# Patient Record
Sex: MALE | Race: WHITE | NOT HISPANIC OR LATINO | Employment: FULL TIME | URBAN - METROPOLITAN AREA
[De-identification: names, ages, dates, MRNs, and addresses within clinical notes are randomized per-mention and may not be internally consistent; named-entity substitution may affect disease eponyms.]

---

## 2018-03-19 ENCOUNTER — HOSPITAL ENCOUNTER (EMERGENCY)
Facility: HOSPITAL | Age: 33
Discharge: LEFT BEFORE DISCHARGE | End: 2018-03-19
Payer: COMMERCIAL

## 2018-03-19 ENCOUNTER — APPOINTMENT (EMERGENCY)
Dept: RADIOLOGY | Facility: HOSPITAL | Age: 33
End: 2018-03-19
Payer: COMMERCIAL
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
## 2018-03-19 PROBLEM — S00.81XA FACIAL ABRASION: Status: ACTIVE | Noted: 2018-03-19

## 2018-03-19 PROBLEM — R00.0 TACHYCARDIA: Status: ACTIVE | Noted: 2018-03-19

## 2018-03-19 PROBLEM — M25.569 KNEE PAIN: Status: ACTIVE | Noted: 2018-03-19

## 2018-03-19 LAB
ANION GAP SERPL CALCULATED.3IONS-SCNC: 6 MMOL/L (ref 4–13)
ATRIAL RATE: 87 BPM
BASE EXCESS BLDA CALC-SCNC: -2 MMOL/L (ref -2–3)
BASOPHILS # BLD AUTO: 0.05 THOUSANDS/ΜL (ref 0–0.1)
BASOPHILS NFR BLD AUTO: 1 % (ref 0–1)
BUN SERPL-MCNC: 10 MG/DL (ref 5–25)
CA-I BLD-SCNC: 1.17 MMOL/L (ref 1.12–1.32)
CALCIUM SERPL-MCNC: 8.6 MG/DL (ref 8.3–10.1)
CHLORIDE SERPL-SCNC: 107 MMOL/L (ref 100–108)
CO2 SERPL-SCNC: 26 MMOL/L (ref 21–32)
CREAT SERPL-MCNC: 0.94 MG/DL (ref 0.6–1.3)
EOSINOPHIL # BLD AUTO: 0.19 THOUSAND/ΜL (ref 0–0.61)
EOSINOPHIL NFR BLD AUTO: 2 % (ref 0–6)
ERYTHROCYTE [DISTWIDTH] IN BLOOD BY AUTOMATED COUNT: 12.4 % (ref 11.6–15.1)
GFR SERPL CREATININE-BSD FRML MDRD: 107 ML/MIN/1.73SQ M
GLUCOSE SERPL-MCNC: 107 MG/DL (ref 65–140)
GLUCOSE SERPL-MCNC: 115 MG/DL (ref 65–140)
HCO3 BLDA-SCNC: 24 MMOL/L (ref 24–30)
HCT VFR BLD AUTO: 45.7 % (ref 36.5–49.3)
HCT VFR BLD CALC: 47 % (ref 36.5–49.3)
HGB BLD-MCNC: 16.1 G/DL (ref 12–17)
HGB BLDA-MCNC: 16 G/DL (ref 12–17)
INR PPP: 0.98 (ref 0.86–1.16)
LYMPHOCYTES # BLD AUTO: 3.53 THOUSANDS/ΜL (ref 0.6–4.47)
LYMPHOCYTES NFR BLD AUTO: 34 % (ref 14–44)
MCH RBC QN AUTO: 30.4 PG (ref 26.8–34.3)
MCHC RBC AUTO-ENTMCNC: 35.2 G/DL (ref 31.4–37.4)
MCV RBC AUTO: 86 FL (ref 82–98)
MONOCYTES # BLD AUTO: 0.5 THOUSAND/ΜL (ref 0.17–1.22)
MONOCYTES NFR BLD AUTO: 5 % (ref 4–12)
NEUTROPHILS # BLD AUTO: 6 THOUSANDS/ΜL (ref 1.85–7.62)
NEUTS SEG NFR BLD AUTO: 58 % (ref 43–75)
NRBC BLD AUTO-RTO: 0 /100 WBCS
P AXIS: 60 DEGREES
PCO2 BLD: 25 MMOL/L (ref 21–32)
PCO2 BLD: 42.7 MM HG (ref 42–50)
PH BLD: 7.36 [PH] (ref 7.3–7.4)
PLATELET # BLD AUTO: 235 THOUSANDS/UL (ref 149–390)
PMV BLD AUTO: 9.7 FL (ref 8.9–12.7)
PO2 BLD: 30 MM HG (ref 35–45)
POTASSIUM BLD-SCNC: 3.7 MMOL/L (ref 3.5–5.3)
POTASSIUM SERPL-SCNC: 4.4 MMOL/L (ref 3.5–5.3)
PR INTERVAL: 152 MS
PROTHROMBIN TIME: 13 SECONDS (ref 12.1–14.4)
QRS AXIS: 1 DEGREES
QRSD INTERVAL: 112 MS
QT INTERVAL: 350 MS
QTC INTERVAL: 421 MS
RBC # BLD AUTO: 5.29 MILLION/UL (ref 3.88–5.62)
SAO2 % BLD FROM PO2: 55 % (ref 95–98)
SODIUM BLD-SCNC: 141 MMOL/L (ref 136–145)
SODIUM SERPL-SCNC: 139 MMOL/L (ref 136–145)
SPECIMEN SOURCE: ABNORMAL
T WAVE AXIS: 44 DEGREES
VENTRICULAR RATE: 87 BPM
WBC # BLD AUTO: 10.29 THOUSAND/UL (ref 4.31–10.16)

## 2018-03-19 PROCEDURE — 71045 X-RAY EXAM CHEST 1 VIEW: CPT

## 2018-03-19 PROCEDURE — 82803 BLOOD GASES ANY COMBINATION: CPT

## 2018-03-19 PROCEDURE — 74177 CT ABD & PELVIS W/CONTRAST: CPT

## 2018-03-19 PROCEDURE — 80048 BASIC METABOLIC PNL TOTAL CA: CPT | Performed by: EMERGENCY MEDICINE

## 2018-03-19 PROCEDURE — 99284 EMERGENCY DEPT VISIT MOD MDM: CPT | Performed by: SURGERY

## 2018-03-19 PROCEDURE — 85014 HEMATOCRIT: CPT

## 2018-03-19 PROCEDURE — 84295 ASSAY OF SERUM SODIUM: CPT

## 2018-03-19 PROCEDURE — 90471 IMMUNIZATION ADMIN: CPT

## 2018-03-19 PROCEDURE — 82330 ASSAY OF CALCIUM: CPT

## 2018-03-19 PROCEDURE — 72125 CT NECK SPINE W/O DYE: CPT

## 2018-03-19 PROCEDURE — 96360 HYDRATION IV INFUSION INIT: CPT

## 2018-03-19 PROCEDURE — 82947 ASSAY GLUCOSE BLOOD QUANT: CPT

## 2018-03-19 PROCEDURE — 71260 CT THORAX DX C+: CPT

## 2018-03-19 PROCEDURE — 90715 TDAP VACCINE 7 YRS/> IM: CPT | Performed by: EMERGENCY MEDICINE

## 2018-03-19 PROCEDURE — 96361 HYDRATE IV INFUSION ADD-ON: CPT

## 2018-03-19 PROCEDURE — 93010 ELECTROCARDIOGRAM REPORT: CPT | Performed by: INTERNAL MEDICINE

## 2018-03-19 PROCEDURE — 99285 EMERGENCY DEPT VISIT HI MDM: CPT

## 2018-03-19 PROCEDURE — 85610 PROTHROMBIN TIME: CPT | Performed by: EMERGENCY MEDICINE

## 2018-03-19 PROCEDURE — 73030 X-RAY EXAM OF SHOULDER: CPT

## 2018-03-19 PROCEDURE — 36415 COLL VENOUS BLD VENIPUNCTURE: CPT | Performed by: EMERGENCY MEDICINE

## 2018-03-19 PROCEDURE — 85025 COMPLETE CBC W/AUTO DIFF WBC: CPT | Performed by: EMERGENCY MEDICINE

## 2018-03-19 PROCEDURE — 84132 ASSAY OF SERUM POTASSIUM: CPT

## 2018-03-19 PROCEDURE — 73564 X-RAY EXAM KNEE 4 OR MORE: CPT

## 2018-03-19 PROCEDURE — 70450 CT HEAD/BRAIN W/O DYE: CPT

## 2018-03-19 PROCEDURE — 93005 ELECTROCARDIOGRAM TRACING: CPT

## 2018-03-19 RX ORDER — ACETAMINOPHEN 325 MG/1
650 TABLET ORAL EVERY 6 HOURS PRN
Status: DISCONTINUED | OUTPATIENT
Start: 2018-03-19 | End: 2018-03-19 | Stop reason: HOSPADM

## 2018-03-19 RX ORDER — SODIUM CHLORIDE 9 MG/ML
125 INJECTION, SOLUTION INTRAVENOUS CONTINUOUS
Status: DISCONTINUED | OUTPATIENT
Start: 2018-03-19 | End: 2018-03-19 | Stop reason: HOSPADM

## 2018-03-19 RX ORDER — NAPROXEN 500 MG/1
500 TABLET ORAL 2 TIMES DAILY WITH MEALS
Qty: 14 TABLET | Refills: 0 | Status: SHIPPED | OUTPATIENT
Start: 2018-03-19 | End: 2018-12-03

## 2018-03-19 RX ORDER — METHOCARBAMOL 500 MG/1
500 TABLET, FILM COATED ORAL EVERY 6 HOURS PRN
Status: DISCONTINUED | OUTPATIENT
Start: 2018-03-19 | End: 2018-03-19 | Stop reason: HOSPADM

## 2018-03-19 RX ORDER — METHOCARBAMOL 500 MG/1
500 TABLET, FILM COATED ORAL 2 TIMES DAILY
Qty: 20 TABLET | Refills: 0 | Status: SHIPPED | OUTPATIENT
Start: 2018-03-19 | End: 2018-12-03

## 2018-03-19 RX ADMIN — SODIUM CHLORIDE 125 ML/HR: 0.9 INJECTION, SOLUTION INTRAVENOUS at 15:15

## 2018-03-19 RX ADMIN — ACETAMINOPHEN 650 MG: 325 TABLET ORAL at 15:26

## 2018-03-19 RX ADMIN — IOHEXOL 100 ML: 350 INJECTION, SOLUTION INTRAVENOUS at 14:35

## 2018-03-19 RX ADMIN — METHOCARBAMOL 500 MG: 500 TABLET ORAL at 15:26

## 2018-03-19 RX ADMIN — TETANUS TOXOID, REDUCED DIPHTHERIA TOXOID AND ACELLULAR PERTUSSIS VACCINE, ADSORBED 0.5 ML: 5; 2.5; 8; 8; 2.5 SUSPENSION INTRAMUSCULAR at 14:40

## 2018-03-19 NOTE — DISCHARGE INSTRUCTIONS
Please return to the emergency department immediately if you experience any worsening of her condition, any chest pain or shortness of breath, any lightheadedness or confusion  Otherwise please follow-up with your PCP for further care  Blunt Chest Trauma   WHAT YOU NEED TO KNOW:   Blunt chest trauma is a sudden, forceful injury to your chest  It is often caused by a car or motorcycle accident, blast injury, or a fall  It may also be caused by a sports injury, such as a hit from a baseball  It may be painful to take deep breaths, cough, and sleep  It can take up to 6 weeks for your injury to heal completely  DISCHARGE INSTRUCTIONS:   Seek care immediately if:   · You have a fever    · You become short of breath    · You cough up yellow, green, or bloody sputum  · You have new or increased pain  Contact your healthcare provider if:   · Your pain does not get better after you take pain medicine  · Your pain lasts longer than 8 weeks  · You have questions or concerns about your condition or care  Medicines:   · Prescription pain medicine  may be given  Ask how to take this medicine safely  Prescription pain medicine may cause constipation  Ask your healthcare provider how to prevent or treat constipation  · NSAIDs , such as ibuprofen, help decrease swelling, pain, and fever  NSAIDs can cause stomach bleeding or kidney problems in certain people  If you take blood thinner medicine, always ask your healthcare provider if NSAIDs are safe for you  Always read the medicine label and follow directions  · Take your medicine as directed  Contact your healthcare provider if you think your medicine is not helping or if you have side effects  Tell him or her if you are allergic to any medicine  Keep a list of the medicines, vitamins, and herbs you take  Include the amounts, and when and why you take them  Bring the list or the pill bottles to follow-up visits   Carry your medicine list with you in case of an emergency  Self care:  · Deep breathe and cough  to help prevent pneumonia  Take 10 deep breaths every hour, even when you wake up during the night  Brace your ribs with your hands or a pillow while you take deep breaths or cough  This will help decrease your pain  · Use your incentive spirometer  to help you take deeper breaths  Put the plastic piece into your mouth and take a very deep breath  Hold your breath as long as you can  Then let out your breath  Do this 10 times in a row every hour while you are awake  · Sleep in a recliner or upright position,  the first few nights, to decrease the pain  · Keep moving  around your home  Do not  stay in bed  Take short walks  This will help your lungs work properly and decrease your risk for pneumonia  · Do not smoke  Nicotine and other chemicals decrease the amount of oxygen in your body  Ask your healthcare provider for information if you currently smoke and need help to quit  E-cigarettes or smokeless tobacco still contain nicotine  Talk to your healthcare provider before you use these products  Follow up with your healthcare provider as directed:  Write down your questions so you remember to ask them during your visits  © 2017 [facility] Information is for End User's use only and may not be sold, redistributed or otherwise used for commercial purposes  All illustrations and images included in CareNotes® are the copyrighted property of [employer]  or [employer]  The above information is an  only  It is not intended as medical advice for individual conditions or treatments  Talk to your doctor, nurse or pharmacist before following any medical regimen to see if it is safe and effective for you
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
## 2018-03-20 VITALS
WEIGHT: 260 LBS | OXYGEN SATURATION: 99 % | TEMPERATURE: 98.4 F | HEIGHT: 70 IN | SYSTOLIC BLOOD PRESSURE: 144 MMHG | DIASTOLIC BLOOD PRESSURE: 77 MMHG | RESPIRATION RATE: 18 BRPM | BODY MASS INDEX: 37.22 KG/M2 | HEART RATE: 98 BPM

## 2018-12-03 NOTE — PRE-PROCEDURE INSTRUCTIONS
No outpatient prescriptions have been marked as taking for the 12/12/18 encounter [facility] H[facility] Encounter)      Pre procedure instructions given verbalizes undersatnding

## 2019-02-06 NOTE — PRE-PROCEDURE INSTRUCTIONS
No outpatient prescriptions have been marked as taking for the 2/13/19 encounter [facility] Encounter)  Pre procedure instructions reviewed,verbalizes understanding  Does not take any medications at home

## 2019-02-12 ENCOUNTER — ANESTHESIA EVENT (OUTPATIENT)
Dept: PERIOP | Facility: HOSPITAL | Age: 34
End: 2019-02-12
Payer: COMMERCIAL

## 2019-02-13 ENCOUNTER — ANESTHESIA (OUTPATIENT)
Dept: PERIOP | Facility: HOSPITAL | Age: 34
End: 2019-02-13
Payer: COMMERCIAL

## 2019-02-13 ENCOUNTER — HOSPITAL ENCOUNTER (OUTPATIENT)
Facility: HOSPITAL | Age: 34
Setting detail: OUTPATIENT SURGERY
Discharge: HOME | End: 2019-02-13
Attending: OTOLARYNGOLOGY | Admitting: OTOLARYNGOLOGY
Payer: COMMERCIAL

## 2019-02-13 VITALS
HEIGHT: 70 IN | BODY MASS INDEX: 38.65 KG/M2 | TEMPERATURE: 100.2 F | DIASTOLIC BLOOD PRESSURE: 69 MMHG | RESPIRATION RATE: 16 BRPM | OXYGEN SATURATION: 96 % | SYSTOLIC BLOOD PRESSURE: 131 MMHG | HEART RATE: 97 BPM | WEIGHT: 270 LBS

## 2019-02-13 DIAGNOSIS — J34.89 NASAL OBSTRUCTION: Primary | ICD-10-CM

## 2019-02-13 RX ORDER — MEPERIDINE HYDROCHLORIDE 25 MG/ML
12.5 INJECTION INTRAMUSCULAR; INTRAVENOUS; SUBCUTANEOUS ONCE AS NEEDED
Status: DISCONTINUED | OUTPATIENT
Start: 2019-02-13 | End: 2019-02-13 | Stop reason: HOSPADM

## 2019-02-13 RX ORDER — CLARITHROMYCIN 500 MG/1
500 TABLET, COATED ORAL EVERY 12 HOURS SCHEDULED
Qty: 20 TABLET | Refills: 0 | Status: SHIPPED | OUTPATIENT
Start: 2019-02-13 | End: 2019-02-23

## 2019-02-13 RX ORDER — SODIUM CHLORIDE, SODIUM LACTATE, POTASSIUM CHLORIDE, CALCIUM CHLORIDE 600; 310; 30; 20 MG/100ML; MG/100ML; MG/100ML; MG/100ML
20 INJECTION, SOLUTION INTRAVENOUS CONTINUOUS
Status: DISCONTINUED | OUTPATIENT
Start: 2019-02-13 | End: 2019-02-13 | Stop reason: HOSPADM

## 2019-02-13 RX ORDER — DEXMEDETOMIDINE HYDROCHLORIDE 100 UG/ML
INJECTION, SOLUTION INTRAVENOUS AS NEEDED
Status: DISCONTINUED | OUTPATIENT
Start: 2019-02-13 | End: 2019-02-13 | Stop reason: SURG

## 2019-02-13 RX ORDER — FENTANYL CITRATE/PF 50 MCG/ML
50 SYRINGE (ML) INJECTION
Status: DISCONTINUED | OUTPATIENT
Start: 2019-02-13 | End: 2019-02-13 | Stop reason: HOSPADM

## 2019-02-13 RX ORDER — DIPHENHYDRAMINE HYDROCHLORIDE 50 MG/ML
12.5 INJECTION INTRAMUSCULAR; INTRAVENOUS ONCE AS NEEDED
Status: DISCONTINUED | OUTPATIENT
Start: 2019-02-13 | End: 2019-02-13 | Stop reason: HOSPADM

## 2019-02-13 RX ORDER — ONDANSETRON 2 MG/ML
INJECTION INTRAMUSCULAR; INTRAVENOUS AS NEEDED
Status: DISCONTINUED | OUTPATIENT
Start: 2019-02-13 | End: 2019-02-13 | Stop reason: SURG

## 2019-02-13 RX ORDER — METOCLOPRAMIDE HYDROCHLORIDE 5 MG/ML
10 INJECTION INTRAMUSCULAR; INTRAVENOUS ONCE AS NEEDED
Status: DISCONTINUED | OUTPATIENT
Start: 2019-02-13 | End: 2019-02-13 | Stop reason: HOSPADM

## 2019-02-13 RX ORDER — LIDOCAINE HYDROCHLORIDE 10 MG/ML
INJECTION, SOLUTION INFILTRATION; PERINEURAL AS NEEDED
Status: DISCONTINUED | OUTPATIENT
Start: 2019-02-13 | End: 2019-02-13 | Stop reason: SURG

## 2019-02-13 RX ORDER — MIDAZOLAM HYDROCHLORIDE 1 MG/ML
INJECTION INTRAMUSCULAR; INTRAVENOUS AS NEEDED
Status: DISCONTINUED | OUTPATIENT
Start: 2019-02-13 | End: 2019-02-13 | Stop reason: SURG

## 2019-02-13 RX ORDER — PROPOFOL 10 MG/ML
INJECTION, EMULSION INTRAVENOUS AS NEEDED
Status: DISCONTINUED | OUTPATIENT
Start: 2019-02-13 | End: 2019-02-13 | Stop reason: SURG

## 2019-02-13 RX ORDER — ACETAMINOPHEN 325 MG/1
650 TABLET ORAL EVERY 6 HOURS PRN
Status: DISCONTINUED | OUTPATIENT
Start: 2019-02-13 | End: 2019-02-13 | Stop reason: HOSPADM

## 2019-02-13 RX ORDER — FENTANYL CITRATE 50 UG/ML
INJECTION, SOLUTION INTRAMUSCULAR; INTRAVENOUS AS NEEDED
Status: DISCONTINUED | OUTPATIENT
Start: 2019-02-13 | End: 2019-02-13 | Stop reason: SURG

## 2019-02-13 RX ORDER — LIDOCAINE HYDROCHLORIDE AND EPINEPHRINE 10; 10 MG/ML; UG/ML
INJECTION, SOLUTION INFILTRATION; PERINEURAL AS NEEDED
Status: DISCONTINUED | OUTPATIENT
Start: 2019-02-13 | End: 2019-02-13 | Stop reason: HOSPADM

## 2019-02-13 RX ORDER — HYDROMORPHONE HCL/PF 1 MG/ML
0.5 SYRINGE (ML) INJECTION
Status: DISCONTINUED | OUTPATIENT
Start: 2019-02-13 | End: 2019-02-13 | Stop reason: HOSPADM

## 2019-02-13 RX ORDER — CLINDAMYCIN PHOSPHATE 900 MG/50ML
INJECTION INTRAVENOUS AS NEEDED
Status: DISCONTINUED | OUTPATIENT
Start: 2019-02-13 | End: 2019-02-13 | Stop reason: SURG

## 2019-02-13 RX ORDER — PROMETHAZINE HYDROCHLORIDE 25 MG/ML
25 INJECTION, SOLUTION INTRAMUSCULAR; INTRAVENOUS ONCE AS NEEDED
Status: DISCONTINUED | OUTPATIENT
Start: 2019-02-13 | End: 2019-02-13 | Stop reason: HOSPADM

## 2019-02-13 RX ORDER — GINSENG 100 MG
CAPSULE ORAL AS NEEDED
Status: DISCONTINUED | OUTPATIENT
Start: 2019-02-13 | End: 2019-02-13 | Stop reason: HOSPADM

## 2019-02-13 RX ORDER — HYDROCODONE BITARTRATE AND ACETAMINOPHEN 5; 325 MG/1; MG/1
1 TABLET ORAL EVERY 6 HOURS PRN
Status: DISCONTINUED | OUTPATIENT
Start: 2019-02-13 | End: 2019-02-13 | Stop reason: HOSPADM

## 2019-02-13 RX ORDER — HYDROCODONE BITARTRATE AND ACETAMINOPHEN 5; 325 MG/1; MG/1
2 TABLET ORAL EVERY 4 HOURS PRN
Qty: 15 TABLET | Refills: 0 | Status: SHIPPED | OUTPATIENT
Start: 2019-02-13 | End: 2019-02-23

## 2019-02-13 RX ORDER — SUCCINYLCHOLINE CHLORIDE 20 MG/ML
INJECTION INTRAMUSCULAR; INTRAVENOUS AS NEEDED
Status: DISCONTINUED | OUTPATIENT
Start: 2019-02-13 | End: 2019-02-13 | Stop reason: SURG

## 2019-02-13 RX ADMIN — CLINDAMYCIN PHOSPHATE 900 MG: 18 INJECTION, SOLUTION INTRAMUSCULAR; INTRAVENOUS at 11:07

## 2019-02-13 RX ADMIN — DEXMEDETOMIDINE HYDROCHLORIDE 8 MCG: 100 INJECTION, SOLUTION INTRAVENOUS at 11:15

## 2019-02-13 RX ADMIN — PROPOFOL 300 MG: 10 INJECTION, EMULSION INTRAVENOUS at 10:55

## 2019-02-13 RX ADMIN — MIDAZOLAM 2 MG: 1 INJECTION INTRAMUSCULAR; INTRAVENOUS at 10:46

## 2019-02-13 RX ADMIN — LIDOCAINE HYDROCHLORIDE 50 MG: 10 INJECTION, SOLUTION INFILTRATION; PERINEURAL at 10:55

## 2019-02-13 RX ADMIN — ONDANSETRON 4 MG: 2 INJECTION INTRAMUSCULAR; INTRAVENOUS at 11:37

## 2019-02-13 RX ADMIN — SODIUM CHLORIDE, SODIUM LACTATE, POTASSIUM CHLORIDE, AND CALCIUM CHLORIDE 20 ML/HR: .6; .31; .03; .02 INJECTION, SOLUTION INTRAVENOUS at 09:48

## 2019-02-13 RX ADMIN — DEXMEDETOMIDINE HYDROCHLORIDE 8 MCG: 100 INJECTION, SOLUTION INTRAVENOUS at 11:26

## 2019-02-13 RX ADMIN — SUCCINYLCHOLINE CHLORIDE 140 MG: 20 INJECTION, SOLUTION INTRAMUSCULAR; INTRAVENOUS at 10:55

## 2019-02-13 RX ADMIN — DEXMEDETOMIDINE HYDROCHLORIDE 8 MCG: 100 INJECTION, SOLUTION INTRAVENOUS at 11:10

## 2019-02-13 RX ADMIN — FENTANYL CITRATE 100 MCG: 50 INJECTION, SOLUTION INTRAMUSCULAR; INTRAVENOUS at 10:55

## 2019-02-13 RX ADMIN — DEXAMETHASONE SODIUM PHOSPHATE 10 MG: 10 INJECTION INTRAMUSCULAR; INTRAVENOUS at 11:07

## 2019-02-13 RX ADMIN — HYDROCODONE BITARTRATE AND ACETAMINOPHEN 1 TABLET: 5; 325 TABLET ORAL at 13:05

## 2019-02-13 NOTE — ANESTHESIA POSTPROCEDURE EVALUATION
Post-Op Assessment Note    CV Status:  Stable  Pain Score: 0    Pain management: adequate     Mental Status:  Alert and awake   Hydration Status:  Euvolemic   PONV Controlled:  Controlled   Airway Patency:  Patent   Post Op Vitals Reviewed: Yes      Staff: CRNA           /60 (02/13/19 1206)    Temp (!) 97 2 °F (36 2 °C) (02/13/19 1206)    Pulse 102 (02/13/19 1206)   Resp 16 (02/13/19 1206)    SpO2 99 % (02/13/19 1206)

## 2019-02-13 NOTE — ANESTHESIA PREPROCEDURE EVALUATION
Review of Systems/Medical History  Patient summary reviewed        Cardiovascular   Pulmonary       GI/Hepatic            Endo/Other     GYN       Hematology   Musculoskeletal       Neurology   Psychology           Physical Exam    Airway    Mallampati score: II  TM Distance: >3 FB  Neck ROM: full     Dental       Cardiovascular      Pulmonary      Other Findings        Anesthesia Plan  ASA Score- 2     Anesthesia Type- general with ASA Monitors  Additional Monitors:   Airway Plan: ETT  Plan Factors-    Induction- intravenous  Postoperative Plan-     Informed Consent- Anesthetic plan and risks discussed with patient  I personally reviewed this patient with the CRNA  Discussed and agreed on the Anesthesia Plan with the CRNA  Clay Nair

## 2019-02-13 NOTE — OP NOTE
OPERATIVE REPORT  PATIENT NAME: Amanda Rodriguez    :  1985  MRN: [id number]  Pt Location: [facility] OR ROOM 05    SURGERY DATE: 2019    Surgeon(s) and Role:     * Rojelio Mejia MD - Primary    Preop Diagnosis:  Other specified disorders of nose and nasal sinuses [J34 89]    Post-Op Diagnosis Codes:     * Other specified disorders of nose and nasal sinuses [J34 89]    Procedure(s) (LRB):  REVISION ENDOSCOPIC POSTERIOR SEPTOPLASTY AND LYSIS INTRANASAL ADHESIONS (N/A)    Specimen(s):  * No specimens in log *    Estimated Blood Loss:   Minimal    Drains:  * No LDAs found *    Anesthesia Type:   General    Operative Indications: Other specified disorders of nose and nasal sinuses [J34 89]  Nasal obstruction    Operative Findings:  Left septal spur, right intranasal adhesion    Complications:   None    Procedure and Technique:  The patient was identified and brought into the operating room and placed on the operating table in a supine position  General anesthesia was induced  The patient was prepped and draped in the usual fashion  4% soaked cocaine pledgets were inserted into the nasal cavity bilaterally  A time-out was performed and the operation was begun  The pledgets removed  A 0 degree endoscope was introduced into the nose bilaterally  The nasal septum posterior half and bilateral region of the sphenopalatine arteries was injected with 1% lidocaine with 1 100,000 epinephrine for a measure of hemostasis  Working on the right side 1st and intranasal adhesion between the inferior turbinate and septum was identified  This was also injected with lidocaine and epinephrine  It was lysed sharply with a through cutting Blakesley forceps  The inferior turbinate was then gently outfractured to create additional space  Next a mid septal Nabeel incision was made endoscopically on the left side    The perpendicular plate of the ethmoid was identified and a mucoperiosteal flap was elevated over a septal spur on the left side  The perpendicular plate of the ethmoid was then incised with a Ducor elevator and a similar flap was elevated on the opposite side  A portion of the perpendicular plate of the ethmoid inclusive of the spur was then resected sharply with the curved Davis scissors  Additional bone from the perpendicular plate of the ethmoid was removed with a [place] Karvonen forceps  No perforation was created on the right side  The left-sided flap was then laid down against the right side  The inferior turbinate on this side was also gently outfractured with a Lincoln elevator  At this point bacitracin covered Haas splints were placed on either side of the nasal septum and secured in place with a Prolene suture holding the flaps in place  The patient was then gently awakened general anesthesia transported to the PACU in stable condition  He tolerated this well       I was present for the entire procedure    Patient Disposition:  PACU     SIGNATURE: Goldy Ruiz MD  DATE: February 13, 2019  TIME: 11:50 AM

## 2019-02-13 NOTE — DISCHARGE INSTRUCTIONS
Activity:  Light activity, no nose blowing, sneeze with your mouth open, change nasal drip pad as necessary, take your antibiotics as scheduled    Diet: Resume your regular diet    Special Instructions: Call or return with questions, concerns, or return of symptoms and No alcoholic drinks, driving, or operating heavy machinery while on prescribed pain medications    Call your physician for:  Shortness of breath that does not improve, Fever above 101ºF (38  3ºC) or shaking chills, Increasing sputum, Chest pain develops or worsens, Pain not controlled by the medication prescribed for you, Increased redness, swelling or drainage around your wound   or Questions or concerns

## 2019-08-29 ENCOUNTER — HOSPITAL ENCOUNTER (EMERGENCY)
Facility: HOSPITAL | Age: 34
Discharge: HOME | End: 2019-08-30
Attending: EMERGENCY MEDICINE | Admitting: EMERGENCY MEDICINE
Payer: COMMERCIAL

## 2019-08-29 ENCOUNTER — APPOINTMENT (EMERGENCY)
Dept: RADIOLOGY | Facility: HOSPITAL | Age: 34
End: 2019-08-29
Payer: COMMERCIAL

## 2019-08-29 DIAGNOSIS — N45.1 EPIDIDYMITIS: ICD-10-CM

## 2019-08-29 DIAGNOSIS — N50.812 LEFT TESTICULAR PAIN: Primary | ICD-10-CM

## 2019-08-29 DIAGNOSIS — M79.605 LEFT LEG PAIN: ICD-10-CM

## 2019-08-29 LAB
ALBUMIN SERPL BCP-MCNC: 4.5 G/DL (ref 3.5–5)
ALP SERPL-CCNC: 80 U/L (ref 46–116)
ALT SERPL W P-5'-P-CCNC: 50 U/L (ref 12–78)
ANION GAP SERPL CALCULATED.3IONS-SCNC: 6 MMOL/L (ref 4–13)
APTT PPP: 28 SECONDS (ref 23–37)
AST SERPL W P-5'-P-CCNC: 19 U/L (ref 5–45)
BASOPHILS # BLD AUTO: 0.08 THOUSANDS/ΜL (ref 0–0.1)
BASOPHILS NFR BLD AUTO: 1 % (ref 0–1)
BILIRUB SERPL-MCNC: 0.66 MG/DL (ref 0.2–1)
BUN SERPL-MCNC: 12 MG/DL (ref 5–25)
CALCIUM SERPL-MCNC: 9.3 MG/DL (ref 8.3–10.1)
CHLORIDE SERPL-SCNC: 104 MMOL/L (ref 100–108)
CO2 SERPL-SCNC: 30 MMOL/L (ref 21–32)
CREAT SERPL-MCNC: 1.09 MG/DL (ref 0.6–1.3)
EOSINOPHIL # BLD AUTO: 0.15 THOUSAND/ΜL (ref 0–0.61)
EOSINOPHIL NFR BLD AUTO: 1 % (ref 0–6)
ERYTHROCYTE [DISTWIDTH] IN BLOOD BY AUTOMATED COUNT: 12.1 % (ref 11.6–15.1)
GFR SERPL CREATININE-BSD FRML MDRD: 89 ML/MIN/1.73SQ M
GLUCOSE SERPL-MCNC: 110 MG/DL (ref 65–140)
HCT VFR BLD AUTO: 50.4 % (ref 36.5–49.3)
HGB BLD-MCNC: 17.1 G/DL (ref 12–17)
IMM GRANULOCYTES # BLD AUTO: 0.06 THOUSAND/UL (ref 0–0.2)
IMM GRANULOCYTES NFR BLD AUTO: 1 % (ref 0–2)
INR PPP: 1.07 (ref 0.84–1.19)
LYMPHOCYTES # BLD AUTO: 2.04 THOUSANDS/ΜL (ref 0.6–4.47)
LYMPHOCYTES NFR BLD AUTO: 19 % (ref 14–44)
MCH RBC QN AUTO: 30.2 PG (ref 26.8–34.3)
MCHC RBC AUTO-ENTMCNC: 33.9 G/DL (ref 31.4–37.4)
MCV RBC AUTO: 89 FL (ref 82–98)
MONOCYTES # BLD AUTO: 0.7 THOUSAND/ΜL (ref 0.17–1.22)
MONOCYTES NFR BLD AUTO: 7 % (ref 4–12)
NEUTROPHILS # BLD AUTO: 7.69 THOUSANDS/ΜL (ref 1.85–7.62)
NEUTS SEG NFR BLD AUTO: 71 % (ref 43–75)
NRBC BLD AUTO-RTO: 0 /100 WBCS
PLATELET # BLD AUTO: 219 THOUSANDS/UL (ref 149–390)
PMV BLD AUTO: 9.9 FL (ref 8.9–12.7)
POTASSIUM SERPL-SCNC: 4 MMOL/L (ref 3.5–5.3)
PROT SERPL-MCNC: 8.3 G/DL (ref 6.4–8.2)
PROTHROMBIN TIME: 13.5 SECONDS (ref 11.6–14.5)
RBC # BLD AUTO: 5.67 MILLION/UL (ref 3.88–5.62)
SODIUM SERPL-SCNC: 140 MMOL/L (ref 136–145)
WBC # BLD AUTO: 10.72 THOUSAND/UL (ref 4.31–10.16)

## 2019-08-29 PROCEDURE — 85610 PROTHROMBIN TIME: CPT | Performed by: EMERGENCY MEDICINE

## 2019-08-29 PROCEDURE — 99284 EMERGENCY DEPT VISIT MOD MDM: CPT

## 2019-08-29 PROCEDURE — 76870 US EXAM SCROTUM: CPT

## 2019-08-29 PROCEDURE — 36415 COLL VENOUS BLD VENIPUNCTURE: CPT | Performed by: EMERGENCY MEDICINE

## 2019-08-29 PROCEDURE — 85730 THROMBOPLASTIN TIME PARTIAL: CPT | Performed by: EMERGENCY MEDICINE

## 2019-08-29 PROCEDURE — 99284 EMERGENCY DEPT VISIT MOD MDM: CPT | Performed by: EMERGENCY MEDICINE

## 2019-08-29 PROCEDURE — 96374 THER/PROPH/DIAG INJ IV PUSH: CPT

## 2019-08-29 PROCEDURE — 85025 COMPLETE CBC W/AUTO DIFF WBC: CPT | Performed by: EMERGENCY MEDICINE

## 2019-08-29 PROCEDURE — 96361 HYDRATE IV INFUSION ADD-ON: CPT

## 2019-08-29 PROCEDURE — 80053 COMPREHEN METABOLIC PANEL: CPT | Performed by: EMERGENCY MEDICINE

## 2019-08-29 RX ORDER — KETOROLAC TROMETHAMINE 30 MG/ML
15 INJECTION, SOLUTION INTRAMUSCULAR; INTRAVENOUS ONCE
Status: COMPLETED | OUTPATIENT
Start: 2019-08-29 | End: 2019-08-29

## 2019-08-29 RX ADMIN — SODIUM CHLORIDE 1000 ML: 0.9 INJECTION, SOLUTION INTRAVENOUS at 22:50

## 2019-08-29 RX ADMIN — KETOROLAC TROMETHAMINE 15 MG: 30 INJECTION, SOLUTION INTRAMUSCULAR at 22:48

## 2019-08-30 ENCOUNTER — HOSPITAL ENCOUNTER (OUTPATIENT)
Dept: ULTRASOUND IMAGING | Facility: HOSPITAL | Age: 34
Discharge: HOME | End: 2019-08-30
Attending: EMERGENCY MEDICINE
Payer: COMMERCIAL

## 2019-08-30 VITALS
DIASTOLIC BLOOD PRESSURE: 67 MMHG | SYSTOLIC BLOOD PRESSURE: 140 MMHG | TEMPERATURE: 98.1 F | RESPIRATION RATE: 18 BRPM | BODY MASS INDEX: 38.74 KG/M2 | WEIGHT: 270 LBS | OXYGEN SATURATION: 97 % | HEART RATE: 99 BPM

## 2019-08-30 DIAGNOSIS — M79.605 LEFT LEG PAIN: ICD-10-CM

## 2019-08-30 LAB
ATRIAL RATE: 99 BPM
BILIRUB UR QL STRIP: NEGATIVE
C TRACH DNA SPEC QL NAA+PROBE: NEGATIVE
CLARITY UR: CLEAR
COLOR UR: YELLOW
GLUCOSE UR STRIP-MCNC: NEGATIVE MG/DL
HGB UR QL STRIP.AUTO: NEGATIVE
KETONES UR STRIP-MCNC: NEGATIVE MG/DL
LEUKOCYTE ESTERASE UR QL STRIP: NEGATIVE
N GONORRHOEA DNA SPEC QL NAA+PROBE: NEGATIVE
NITRITE UR QL STRIP: NEGATIVE
P AXIS: 59 DEGREES
PH UR STRIP.AUTO: 6.5 [PH]
PR INTERVAL: 160 MS
PROT UR STRIP-MCNC: NEGATIVE MG/DL
QRS AXIS: -1 DEGREES
QRSD INTERVAL: 92 MS
QT INTERVAL: 322 MS
QTC INTERVAL: 413 MS
SP GR UR STRIP.AUTO: 1.02 (ref 1–1.03)
T WAVE AXIS: 51 DEGREES
UROBILINOGEN UR QL STRIP.AUTO: 1 E.U./DL
VENTRICULAR RATE: 99 BPM

## 2019-08-30 PROCEDURE — 93971 EXTREMITY STUDY: CPT

## 2019-08-30 PROCEDURE — 81003 URINALYSIS AUTO W/O SCOPE: CPT | Performed by: EMERGENCY MEDICINE

## 2019-08-30 PROCEDURE — 87491 CHLMYD TRACH DNA AMP PROBE: CPT | Performed by: EMERGENCY MEDICINE

## 2019-08-30 PROCEDURE — 96372 THER/PROPH/DIAG INJ SC/IM: CPT

## 2019-08-30 PROCEDURE — 93005 ELECTROCARDIOGRAM TRACING: CPT

## 2019-08-30 PROCEDURE — 93010 ELECTROCARDIOGRAM REPORT: CPT | Performed by: INTERNAL MEDICINE

## 2019-08-30 PROCEDURE — 87591 N.GONORRHOEAE DNA AMP PROB: CPT | Performed by: EMERGENCY MEDICINE

## 2019-08-30 RX ORDER — DOXYCYCLINE HYCLATE 100 MG/1
100 CAPSULE ORAL ONCE
Status: COMPLETED | OUTPATIENT
Start: 2019-08-30 | End: 2019-08-30

## 2019-08-30 RX ORDER — DOXYCYCLINE 100 MG/1
100 CAPSULE ORAL 2 TIMES DAILY
Qty: 20 CAPSULE | Refills: 0 | Status: SHIPPED | OUTPATIENT
Start: 2019-08-30 | End: 2019-09-09

## 2019-08-30 RX ADMIN — LIDOCAINE HYDROCHLORIDE 250 MG: 10 INJECTION, SOLUTION EPIDURAL; INFILTRATION; INTRACAUDAL; PERINEURAL at 00:17

## 2019-08-30 RX ADMIN — DOXYCYCLINE 100 MG: 100 CAPSULE ORAL at 00:29

## 2019-08-30 NOTE — ED PROVIDER NOTES
History  Chief Complaint   Patient presents with    Leg Pain     Pt c/o bilateral leg pain and swelling with red patches on each leg  Pt also c/o swollen testicles that are painful to touch  Pt states he felt like he was going to "pass out" when getting into hospital bed  HPI    36 y o  m presents to the ED for evaluation of left testicular pain  Patient states that he has been having testicular pain for the past 2 days, it is tender to the touch  He states that is testicle is swollen  He endorses nausea from the pain, but denies any abdominal pain, and denies any emesis  He has endorse some weakness, he states that he has been walking and standing for 24 hours at a time, 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  He is also concerned because he noticed that he had some redness in his left lower leg, no calf tenderness, that began 3 days prior  He denies any fevers  He states that he has had redness the past leg, when he stands a lot  He does have tattoos, but he said that the tattoos are several years old  Otherwise on ROS, patient denies any other symptoms  None       History reviewed  No pertinent past medical history  Past Surgical History:   Procedure Laterality Date    NASAL/SINUS ENDOSCOPY N/A 2/13/2019    Procedure: REVISION ENDOSCOPIC POSTERIOR SEPTOPLASTY AND LYSIS INTRANASAL ADHESIONS;  Surgeon: Devin Miles MD;  Location: [facility];  Service: ENT    SEPTOPLASTY      WISDOM TOOTH EXTRACTION         Family History   Problem Relation Age of Onset    No Known Problems Mother     No Known Problems Father      I have reviewed and agree with the history as documented  Social History     Tobacco Use    Smoking status: Current Every Day Smoker     Types: Cigars    Smokeless tobacco: Never Used   Substance Use Topics    Alcohol use: No    Drug use: No        Review of Systems   Constitutional: Negative for chills, fatigue and fever  HENT: Negative for nosebleeds and sore throat      Eyes: Negative for redness and visual disturbance  Respiratory: Negative for shortness of breath and wheezing  Cardiovascular: Negative for chest pain and palpitations  Gastrointestinal: Negative for abdominal pain and diarrhea  Endocrine: Negative for polyuria  Genitourinary: Positive for testicular pain  Negative for decreased urine volume, difficulty urinating, discharge, dysuria, enuresis, flank pain, frequency, genital sores, hematuria, penile pain, penile swelling, scrotal swelling and urgency  Musculoskeletal: Negative for back pain and neck stiffness  Skin: Positive for color change and rash  Negative for wound  Neurological: Negative for seizures, speech difficulty and headaches  Psychiatric/Behavioral: Negative for dysphoric mood and hallucinations  All other systems reviewed and are negative  Physical Exam  ED Triage Vitals   Temperature Pulse Respirations Blood Pressure SpO2   08/29/19 2034 08/29/19 2034 08/29/19 2034 08/29/19 2034 08/29/19 2034   98 1 °F (36 7 °C) (!) 114 18 (!) 190/86 98 %      Temp src Heart Rate Source Patient Position - Orthostatic VS BP Location FiO2 (%)   -- 08/29/19 2100 08/29/19 2100 08/29/19 2200 --    Monitor Lying Left arm       Pain Score       08/29/19 2200       3             Orthostatic Vital Signs  Vitals:    08/29/19 2034 08/29/19 2100 08/29/19 2200 08/30/19 0021   BP: (!) 190/86 128/70 123/89 140/67   Pulse: (!) 114 (!) 108 (!) 107 99   Patient Position - Orthostatic VS:  Lying Lying Lying       Physical Exam   Constitutional: He is oriented to person, place, and time  He appears well-developed and well-nourished  HENT:   Head: Normocephalic and atraumatic  Right Ear: External ear normal    Left Ear: External ear normal    Mouth/Throat: Oropharynx is clear and moist    Eyes: Conjunctivae and EOM are normal    Neck: Normal range of motion  Cardiovascular: Normal rate, regular rhythm, normal heart sounds and intact distal pulses  Pulmonary/Chest: Effort normal and breath sounds normal  He has no wheezes  He exhibits no tenderness  Abdominal: Soft  Bowel sounds are normal  There is no tenderness  There is no guarding  Genitourinary:   Genitourinary Comments: Patient has tenderness of his left testicle on palpation  Pain is not relieved with elevation  Right testicle nontender  No overlying skin changes or erythema   Musculoskeletal: Normal range of motion  Neurological: He is alert and oriented to person, place, and time  No cranial nerve deficit or sensory deficit  He exhibits normal muscle tone  Coordination normal    Skin: Skin is warm and dry  Rash noted  See photo below, patient has a macular, blanching erythematous rash, on left shin, calf nontender  Soft compartments  No free air subcutaneous emphysema noted on examination  Psychiatric: He has a normal mood and affect  Nursing note and vitals reviewed  ED Medications  Medications   sodium chloride 0 9 % bolus 1,000 mL (0 mL Intravenous Stopped 8/30/19 0000)   ketorolac (TORADOL) injection 15 mg (15 mg Intravenous Given 8/29/19 2248)   cefTRIAXone (ROCEPHIN) 250 mg in lidocaine (PF) (XYLOCAINE-MPF) 1 % IM only syringe (250 mg Intramuscular Given 8/30/19 0017)   doxycycline hyclate (VIBRAMYCIN) capsule 100 mg (100 mg Oral Given 8/30/19 0029)       Diagnostic Studies  Results Reviewed     Procedure Component Value Units Date/Time    Chlamydia/GC amplified DNA by PCR [[id number]] Collected:  08/30/19 0029    Lab Status:   In process Specimen:  Urine, Other Updated:  08/30/19 0037    UA w Reflex to Microscopic w Reflex to Culture [[id number]] Collected:  08/30/19 0012    Lab Status:  Final result Specimen:  Urine, Clean Catch Updated:  08/30/19 0023     Color, UA Yellow     Clarity, UA Clear     Specific Gravity, UA 1 018     pH, UA 6 5     Leukocytes, UA Negative     Nitrite, UA Negative     Protein, UA Negative mg/dl      Glucose, UA Negative mg/dl      Ketones, UA Negative mg/dl      Urobilinogen, UA 1 0 E U /dl      Bilirubin, UA Negative     Blood, UA Negative    Comprehensive metabolic panel [[id number]]  (Abnormal) Collected:  08/29/19 2246    Lab Status:  Final result Specimen:  Blood from Arm, Right Updated:  08/29/19 2323     Sodium 140 mmol/L      Potassium 4 0 mmol/L      Chloride 104 mmol/L      CO2 30 mmol/L      ANION GAP 6 mmol/L      BUN 12 mg/dL      Creatinine 1 09 mg/dL      Glucose 110 mg/dL      Calcium 9 3 mg/dL      AST 19 U/L      ALT 50 U/L      Alkaline Phosphatase 80 U/L      Total Protein 8 3 g/dL      Albumin 4 5 g/dL      Total Bilirubin 0 66 mg/dL      eGFR 89 ml/min/1 73sq m     Narrative:       [place] guidelines for Chronic Kidney Disease (CKD):     Stage 1 with normal or high GFR (GFR > 90 mL/min/1 73 square meters)    Stage 2 Mild CKD (GFR = 60-89 mL/min/1 73 square meters)    Stage 3A Moderate CKD (GFR = 45-59 mL/min/1 73 square meters)    Stage 3B Moderate CKD (GFR = 30-44 mL/min/1 73 square meters)    Stage 4 Severe CKD (GFR = 15-29 mL/min/1 73 square meters)    Stage 5 End Stage CKD (GFR <15 mL/min/1 73 square meters)  Note: GFR calculation is accurate only with a steady state creatinine    Protime-INR [[id number]]  (Normal) Collected:  08/29/19 2246    Lab Status:  Final result Specimen:  Blood from Arm, Right Updated:  08/29/19 2318     Protime 13 5 seconds      INR 1 07    APTT [[id number]]  (Normal) Collected:  08/29/19 2246    Lab Status:  Final result Specimen:  Blood from Arm, Right Updated:  08/29/19 2318     PTT 28 seconds     CBC and differential [[id number]]  (Abnormal) Collected:  08/29/19 2246    Lab Status:  Final result Specimen:  Blood from Arm, Right Updated:  08/29/19 2302     WBC 10 72 Thousand/uL      RBC 5 67 Million/uL      Hemoglobin 17 1 g/dL      Hematocrit 50 4 %      MCV 89 fL      MCH 30 2 pg      MCHC 33 9 g/dL      RDW 12 1 %      MPV 9 9 fL      Platelets 886 Thousands/uL nRBC 0 /100 WBCs      Neutrophils Relative 71 %      Immat GRANS % 1 %      Lymphocytes Relative 19 %      Monocytes Relative 7 %      Eosinophils Relative 1 %      Basophils Relative 1 %      Neutrophils Absolute 7 69 Thousands/µL      Immature Grans Absolute 0 06 Thousand/uL      Lymphocytes Absolute 2 04 Thousands/µL      Monocytes Absolute 0 70 Thousand/µL      Eosinophils Absolute 0 15 Thousand/µL      Basophils Absolute 0 08 Thousands/µL                  US scrotum and testicles   Final Result by Zoe Borden MD (08/29 2345)       There is hypervascular flow within the left epididymal tail  This suggests left epididymitis  Clinical correlation and follow-up to ensure resolution is recommended  There is a small left hydrocele  There is trace fluid on the right  No evidence of focal intratesticular mass  No evidence of testicular torsion  Workstation performed: [id number]         VAS lower limb venous duplex study, unilateral/limited    (Results Pending)         Procedures  Procedures        ED Course  ED Course as of Aug 30 0129   Thu Aug 29, 2019   2340 WBC(!): 10 72   Fri Aug 30, 2019   0022 ECG 12 Lead Documentation  Date/Time: today/date: 8/30/2019    ECG reviewed by me, the ED Provider: yes    Patient location:  ED   Previous ECG:  Compared to current, no change   Rate:  ECG rate assessment: normal    Rhythm: sinus rhythm    Ectopy:  : none    QRS axis:  Normal  Intervals: normal   Q waves: None   ST segments:  Normal  T waves: normal      Impression: Normal EKG                    MDM    80-year-old male who presents to the ED for evaluation of left testicular pain, as well as left lower extremity skin changes  Left testicular pain, concern for epididymitis, versus torsion  Ultrasound showed no signs of torsion, more likely epididymitis  Patient was treated with ceftriaxone and doxycycline  Had a urinalysis that was sent for gonorrhea/chlamydia, as well   Patient left shin was minimally tender on palpation, skin changes could be from an early cellulitis  Patient was being placed on doxycycline for epididymitis, will also cover skin infections, patient will be discharged with PCP follow-up, as well as a outpatient script for duplex  The patient was instructed to follow up as documented  Strict return precautions were discussed with the patient and the patient was instructed to return to the emergency department immediately if symptoms worsen  The patient/patient family member acknowledged and were in agreement with plan  Disposition  Final diagnoses:   Left testicular pain   Epididymitis   Left leg pain     Time reflects when diagnosis was documented in both MDM as applicable and the Disposition within this note     Time User Action Codes Description Comment    8/30/2019 12:24 AM Madolyn Heckler Add [N50 812] Left testicular pain     8/30/2019 12:27 AM Madolyn Heckler Add [N45 1] Epididymitis     8/30/2019 12:27 AM Madolyn Heckler Modify [N50 812] Left testicular pain     8/30/2019 12:27 AM Madolyn Heckler Modify [N45 1] Epididymitis     8/30/2019 12:27 AM Madolyn Heckler Modify [N50 812] Left testicular pain     8/30/2019 12:27 AM Madolyn Heckler Modify [N45 1] Epididymitis     8/30/2019 12:34 AM Madolyn Heckler Add [M79 605] Left leg pain       ED Disposition     ED Disposition Condition Date/Time Comment    Discharge Stable Fri Aug 30, 2019 12:24 AM Estrella Rivera discharge to home/self care  Follow-up Information     Follow up With Specialties Details Why Contact Info    Edmond Davis PA-C Internal Medicine, Physician Assistant Schedule an appointment as soon as possible for a visit in 1 week For follow up regarding your symptoms and recheck, If unable to get into primary care doctor's office [id number] [facility]  [phone]      [employer]  Call  To find a primary care doctor [phone]      [facility]  Schedule an appointment as soon as possible for a visit in 1 week For follow up with your PCP regarding your symptoms and recheck           Discharge Medication List as of 8/30/2019 12:37 AM      START taking these medications    Details   doxycycline monohydrate (MONODOX) 100 mg capsule Take 1 capsule (100 mg total) by mouth 2 (two) times a day for 10 days, Starting Fri 8/30/2019, Until Mon 9/9/2019, Print           Outpatient Discharge Orders   VAS lower limb venous duplex study, unilateral/limited   Standing Status: Future Standing Exp  Date: 08/30/23       ED Provider  Attending physically available and evaluated Myron Martell I managed the patient along with the ED Attending      Electronically Signed by         Conrad Guerra MD  08/30/19 8675

## 2019-08-30 NOTE — ED ATTENDING ATTESTATION
Ella Floyd MD, saw and evaluated the patient  I have discussed the patient with the resident/non-physician practitioner and agree with the resident's/non-physician practitioner's findings, Plan of Care, and MDM as documented in the resident's/non-physician practitioner's note, except where noted  All available labs and Radiology studies were reviewed  I was present for key portions of any procedure(s) performed by the resident/non-physician practitioner and I was immediately available to provide assistance  At this point I agree with the current assessment done in the Emergency Department  I have conducted an independent evaluation of this patient a history and physical is as follows:    OA:   This is evaluation of a 28-year-old male who presents to the emergency department complaining of bilateral lower extremity pain over anterior lower legs with scattered erythematous patches over his bilateral tib fibs  He describes the pain as "shin split" like pain  Worsened after working 48 hours, long hours on his feet and then going to a football practice this evening  Has had similar previously after long hours on feet  Patient also notes that he has testicular pain and swelling most pronounced on the left compared to the right for 2 days, which is ultimately what brought him to the ED today  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, no exposures/trauma  He denies any trauma or exposures  He denies any urinary symptoms  No penile discharge  No abdominal pain but does feel mildly nauseous from his testicular pain  No fevers no chills  No cp/pressure/palpitations/SOB/YOU  He also notes that he has been feeling weak and had an episode of lightheadedness today when moving into the bed, upon arrival to the ED but believes that it is secondary to his long work hours followed by standing on the sidelines at practice and the testicular pain   Has not rested or eaten/drank much today  On exam patient is currently in no acute distress  He is mildly tachycardic at 103, he is afebrile  HEENT is normocephalic and atraumatic with moist mucous membranes, anicteriec  Neck is supple with full range of motion  Heart is mildly tachycardic, regular  There is no appreciable murmurs  Lungs are clear auscultation bilaterally  Abdomen is soft non-tender non-distended positive bowel sounds no rebound or guarding  Patient has some no appreciable swelling to bilateral lower extremities however with erythematous patchy rash that appears macular over bilateral, more pronounced on the L tib-fib however pt also with multiple tattoes  Scattered areas thania more so than others  TTP only over anterior tib-fib, no palpable cords, no calf ttp, no pain with dorsiflexion, no LE swelling  Testicular exam performed in presence of chaperone tenderness to palpation over left epididymis no appreciable swelling/ erythema  No palpable crepitus  Patient is circumcised  Intact distal pulses and capillary refill less than 2 seconds  Awake alert oriented and appropriate  Assessment and plan testicular pain and lower extremity rash  Will evaluate with basic blood work, check EKG  Will give IV fluid hydration and pain control  Testicular u/s  Will obtain basic blood work  Will re-evaluate and treat accordingly  Pt up and ambulating without difficulty  AFter fluids and pain control, feels improved  Portions of the record may have been created with voice recognition software  Occasional wrong word or sound-a-like" substitutions may have occurred due to the inherent limitations of voice recognition software  Review chart carefully and recognize, using context, where substitutions have occurred    Critical Care Time  Procedures

## 2021-08-19 ENCOUNTER — EVALUATION (OUTPATIENT)
Dept: PHYSICAL THERAPY | Facility: CLINIC | Age: 36
End: 2021-08-19
Payer: COMMERCIAL

## 2021-08-19 DIAGNOSIS — M89.8X1 CHRONIC SCAPULAR PAIN: Primary | ICD-10-CM

## 2021-08-19 DIAGNOSIS — G89.29 CHRONIC SCAPULAR PAIN: Primary | ICD-10-CM

## 2021-08-19 PROCEDURE — 97112 NEUROMUSCULAR REEDUCATION: CPT | Performed by: PHYSICAL THERAPIST

## 2021-08-19 PROCEDURE — 97161 PT EVAL LOW COMPLEX 20 MIN: CPT | Performed by: PHYSICAL THERAPIST

## 2021-08-19 PROCEDURE — 97110 THERAPEUTIC EXERCISES: CPT | Performed by: PHYSICAL THERAPIST

## 2021-08-19 NOTE — LETTER
2021    Rani Moore PA-C  [address]    Patient: Delaney Ty   YOB: 1985   Date of Visit: 2021     Encounter Diagnosis     ICD-10-CM    1  Chronic scapular pain  M89 8X1     G89 29        Dear Dr Wilfrid Price: Thank you for your recent referral of Delaney Ty  Please review the attached evaluation summary from [facility] recent visit  Please verify that you agree with the plan of care by signing the attached order  If you have any questions or concerns, please do not hesitate to call  I sincerely appreciate the opportunity to share in the care of one of your patients and hope to have another opportunity to work with you in the near future  Sincerely,    Olive Gatica, PT      Referring Provider:      I certify that I have read the below Plan of Care and certify the need for these services furnished under this plan of treatment while under my care  Rani Moore PA-C  [address] [place] [address]  Via Fax: [phone]          PT Evaluation     Today's date: 2021  Patient name: Delaney Ty  : 1985  MRN: [id number]  Referring provider: Darcy Valdes  Dx:   Encounter Diagnosis     ICD-10-CM    1  Chronic scapular pain  M89 8X1     G89 29                   Assessment  Assessment details: Pt presents with s/s consistent with chronic R>L midtrap TrP secondary to postural dysfunction  He will benefit from skilled PT services to address his impairments in order to decrease pain and restore function    Impairments: abnormal muscle firing, abnormal muscle tone, abnormal or restricted ROM, abnormal movement, activity intolerance, impaired physical strength, lacks appropriate home exercise program, pain with function, scapular dyskinesis, poor posture  and poor body mechanics  Understanding of Dx/Px/POC: good   Prognosis: good    Goals  STG - 4 wks  1) pt will improve postural abnormalities 50%  2) pt will demonstrate 4/5 B MT, LT strength  3) pt will improve pain levels 50%    LTG - 8-12 wks  1) pt will demonstrate normalized posture  2) pt will demonstrate 4+/5 B MT, LT strength  3) pt will improve pain levels 90%    Plan  Planned modality interventions: low level laser therapy  Planned therapy interventions: joint mobilization, manual therapy, abdominal trunk stabilization, body mechanics training, neuromuscular re-education, patient education, postural training, strengthening, stretching, therapeutic activities, therapeutic exercise, flexibility, functional ROM exercises and home exercise program  Frequency: 2x week  Duration in weeks: 8  Treatment plan discussed with: patient        Subjective Evaluation    History of Present Illness  Mechanism of injury: Pt is a 29 y o  male with unremarkable PMHx  He reports chronic Hx of R>L midscapular pain without radicular symptoms or wrap-around pain  He reports no improvement with chiropractic x 10 treatments or NIKITA, short-term improvement with R MT TrP injection  Pain  Current pain ratin  At best pain ratin  At worst pain ratin  Quality: burning and sharp  Progression: no change    Social Support    Employment status: working ([employer])    Diagnostic Tests  MRI studies: normal (per pt report)  Treatments  Previous treatment: chiropractic, injection treatment and medication  Patient Goals  Patient goals for therapy: decreased pain and increased motion          Objective     Postural Observations  Seated posture: poor  Standing posture: poor  Correction of posture: makes symptoms better    Additional Postural Observation Details  Pt reports fatigue maintaining postural correction > 1 minute    O: severe B rounded shoulders, over-developement of B UT, pec major, anterior deltoid noted    Palpation   Left   Hypertonic in the cervical paraspinals, middle trapezius, rhomboids and upper trapezius       Right   Hypertonic in the cervical paraspinals, middle trapezius, rhomboids and upper trapezius  Tenderness of the middle trapezius  Trigger point to middle trapezius  Active Range of Motion   Cervical/Thoracic Spine       Cervical  Subcranial protraction:  WFL   Subcranial retraction:   Restriction level: moderate  Flexion:  WFL  Extension:  Restriction level: moderate  Left lateral flexion:  Restriction level: moderate  Right lateral flexion:  Restriction level moderate  Left rotation:  Restriction level: minimal  Right rotation:  Restriction level: minimal    Thoracic    Flexion:  Restriction level: minimal  Extension:  Restriction level: moderate  Left lateral flexion:  Restriction level: minimal  Right lateral flexion:  Restriction level: minimal  Left rotation:  Restriction level: minimal  Right rotation:  Restriction level: minimal    Scapular Mobility     Right Shoulder   Scapular Mobility with Shoulder to 90° FF   Scapular elevation: minimal    Scapular Mobility beyond 90° FF   Scapular elevation: minimal  Upward rotation: excessive    Joint Play     Hypomobile: T1, T2, T3, T4, T5, T6, T7, T8 and T9     Strength/Myotome Testing     Left Shoulder     Isolated Muscles   Lower trapezius: 4-   Middle trapezius: 4-   Rhomboids: 4     Right Shoulder     Isolated Muscles   Lower trapezius: 3   Middle trapezius: 3   Rhomboids: 4-              Precautions:  PMHx: unremarkable  Dx: chronic R>L midtrap TrP secondary to postural dysfunction    Manuals 8/19            Gr V prone T3-8 HVLA x1 ea            Gr IV T3-8 PA gallito Crandall R yadira MT, LT                          Neuro Re-Ed             Seated c/s retraction 2"x10            Seated c/s retraction with OP             scap retraction 5"x10            Prone I's 2x10            Prone T's             Prone ER             Prone thoracic extension             Ther Ex             Seated thoracic extension over chair 3"x10            Seated rhomboid/MT stretch 10"x3 Mumtaz pec stretch 10"x3                                                                             Ther Activity                                       Gait Training                                       Modalities

## 2021-08-19 NOTE — PROGRESS NOTES
PT Evaluation     Today's date: 2021  Patient name: Melany Hill  : 1985  MRN: [id number]  Referring provider: Jesse Erwin  Dx:   Encounter Diagnosis     ICD-10-CM    1  Chronic scapular pain  M89 8X1     G89 29                   Assessment  Assessment details: Pt presents with s/s consistent with chronic R>L midtrap TrP secondary to postural dysfunction  He will benefit from skilled PT services to address his impairments in order to decrease pain and restore function  Impairments: abnormal muscle firing, abnormal muscle tone, abnormal or restricted ROM, abnormal movement, activity intolerance, impaired physical strength, lacks appropriate home exercise program, pain with function, scapular dyskinesis, poor posture  and poor body mechanics  Understanding of Dx/Px/POC: good   Prognosis: good    Goals  STG - 4 wks  1) pt will improve postural abnormalities 50%  2) pt will demonstrate 4/5 B MT, LT strength  3) pt will improve pain levels 50%    LTG - 8-12 wks  1) pt will demonstrate normalized posture  2) pt will demonstrate 4+/5 B MT, LT strength  3) pt will improve pain levels 90%    Plan  Planned modality interventions: low level laser therapy  Planned therapy interventions: joint mobilization, manual therapy, abdominal trunk stabilization, body mechanics training, neuromuscular re-education, patient education, postural training, strengthening, stretching, therapeutic activities, therapeutic exercise, flexibility, functional ROM exercises and home exercise program  Frequency: 2x week  Duration in weeks: 8  Treatment plan discussed with: patient        Subjective Evaluation    History of Present Illness  Mechanism of injury: Pt is a 29 y o  male with unremarkable PMHx  He reports chronic Hx of R>L midscapular pain without radicular symptoms or wrap-around pain   He reports no improvement with chiropractic x 10 treatments or NIKITA, short-term improvement with R MT TrP injection  Pain  Current pain ratin  At best pain ratin  At worst pain ratin  Quality: burning and sharp  Progression: no change    Social Support    Employment status: working ([employer])    Diagnostic Tests  MRI studies: normal (per pt report)  Treatments  Previous treatment: chiropractic, injection treatment and medication  Patient Goals  Patient goals for therapy: decreased pain and increased motion          Objective     Postural Observations  Seated posture: poor  Standing posture: poor  Correction of posture: makes symptoms better    Additional Postural Observation Details  Pt reports fatigue maintaining postural correction > 1 minute    O: severe B rounded shoulders, over-developement of B UT, pec major, anterior deltoid noted    Palpation   Left   Hypertonic in the cervical paraspinals, middle trapezius, rhomboids and upper trapezius  Right   Hypertonic in the cervical paraspinals, middle trapezius, rhomboids and upper trapezius  Tenderness of the middle trapezius  Trigger point to middle trapezius       Active Range of Motion   Cervical/Thoracic Spine       Cervical  Subcranial protraction:  WFL   Subcranial retraction:   Restriction level: moderate  Flexion:  WFL  Extension:  Restriction level: moderate  Left lateral flexion:  Restriction level: moderate  Right lateral flexion:  Restriction level moderate  Left rotation:  Restriction level: minimal  Right rotation:  Restriction level: minimal    Thoracic    Flexion:  Restriction level: minimal  Extension:  Restriction level: moderate  Left lateral flexion:  Restriction level: minimal  Right lateral flexion:  Restriction level: minimal  Left rotation:  Restriction level: minimal  Right rotation:  Restriction level: minimal    Scapular Mobility     Right Shoulder   Scapular Mobility with Shoulder to 90° FF   Scapular elevation: minimal    Scapular Mobility beyond 90° FF   Scapular elevation: minimal  Upward rotation: excessive    Joint Play     Hypomobile: T1, T2, T3, T4, T5, T6, T7, T8 and T9     Strength/Myotome Testing     Left Shoulder     Isolated Muscles   Lower trapezius: 4-   Middle trapezius: 4-   Rhomboids: 4     Right Shoulder     Isolated Muscles   Lower trapezius: 3   Middle trapezius: 3   Rhomboids: 4-              Precautions:  PMHx: unremarkable  Dx: chronic R>L midtrap TrP secondary to postural dysfunction    Manuals 8/19            Gr V prone T3-8 HVLA x1 ea            Gr IV T3-8 PA gallito may, MT, LT                          Neuro Re-Ed             Seated c/s retraction 2"x10            Seated c/s retraction with OP             scap retraction 5"x10            Prone I's 2x10            Prone T's             Prone ER             Prone thoracic extension             Ther Ex             Seated thoracic extension over chair 3"x10            Seated rhomboid/MT stretch 10"x3            Doorway pec stretch 10"x3                                                                             Ther Activity                                       Gait Training                                       Modalities

## 2021-08-26 ENCOUNTER — OFFICE VISIT (OUTPATIENT)
Dept: PHYSICAL THERAPY | Facility: CLINIC | Age: 36
End: 2021-08-26
Payer: COMMERCIAL

## 2021-08-26 DIAGNOSIS — G89.29 CHRONIC SCAPULAR PAIN: Primary | ICD-10-CM

## 2021-08-26 DIAGNOSIS — M89.8X1 CHRONIC SCAPULAR PAIN: Primary | ICD-10-CM

## 2021-08-26 PROCEDURE — 97140 MANUAL THERAPY 1/> REGIONS: CPT | Performed by: PHYSICAL THERAPIST

## 2021-08-26 PROCEDURE — 97110 THERAPEUTIC EXERCISES: CPT | Performed by: PHYSICAL THERAPIST

## 2021-08-26 PROCEDURE — 97112 NEUROMUSCULAR REEDUCATION: CPT | Performed by: PHYSICAL THERAPIST

## 2021-08-26 NOTE — PROGRESS NOTES
Daily Note     Today's date: 2021  Patient name: Zulma Landis  : 1985  MRN: [id number]  Referring provider: Sary Casiano PA-C  Dx:   Encounter Diagnosis     ICD-10-CM    1  Chronic scapular pain  M89 8X1     G89 29                   Subjective: Pt reports good compliance with HEP but notes increased soreness after performing  He notes R>L midscapular pain is still intermittent  Objective: See treatment diary below    Assessment: Pt demonstrated improved but still limited tolerance to scap stab  Plan: Assess response nv  Precautions:  PMHx: unremarkable  Dx: chronic R>L midtrap TrP secondary to postural dysfunction    Manuals            Gr V prone T3-8 HVLA x1 ea np           Gr IV T3-8 PA mobs  1x10 ea           Raz R rhomboid, MT, LT  8 min                        Neuro Re-Ed             Seated c/s retraction 2"x10 2"x15           Seated c/s retraction with OP  2"x15           scap retraction 5"x10 5"x10           Prone I's 2x10 3x10           Prone T's  2x10           Prone ER             Prone thoracic extension  2x10           Ther Ex             Seated thoracic extension over chair 3"x10 3"x10           Seated rhomboid/MT stretch 10"x3 10"x3           Doorway pec stretch 10"x3 10"x3                                                                            Ther Activity                                       Gait Training                                       Modalities

## 2021-09-01 ENCOUNTER — OFFICE VISIT (OUTPATIENT)
Dept: PHYSICAL THERAPY | Facility: CLINIC | Age: 36
End: 2021-09-01
Payer: COMMERCIAL

## 2021-09-01 DIAGNOSIS — G89.29 CHRONIC SCAPULAR PAIN: Primary | ICD-10-CM

## 2021-09-01 DIAGNOSIS — M89.8X1 CHRONIC SCAPULAR PAIN: Primary | ICD-10-CM

## 2021-09-01 PROCEDURE — 97110 THERAPEUTIC EXERCISES: CPT

## 2021-09-01 PROCEDURE — 97112 NEUROMUSCULAR REEDUCATION: CPT

## 2021-09-01 PROCEDURE — 97140 MANUAL THERAPY 1/> REGIONS: CPT

## 2021-09-01 NOTE — PROGRESS NOTES
Daily Note     Today's date: 2021  Patient name: Estrella Rivera  : 1985  MRN: [id number]  Referring provider: Roel Tang PA-C  Dx:   Encounter Diagnosis     ICD-10-CM    1  Chronic scapular pain  M89 8X1     G89 29                   Subjective: Pt reports he continues to have mid-back pain primarily along R mid-low trap with no improvement with symptoms since LV  Notes he was less sore following his LV compared to his IE  Objective: See treatment diary below      Assessment: Tolerated treatment fair  Pt was able to perform all exercise with no complaints of increase in symptoms  UT dominance noted with all scapular stabilization exercises, requiring verbal and tactile cueing to correct  Slight - moderate soft tissue restriction present along R low trap during IASTM, which did begin to resolve slightly, but resulted in minimal to no relief for patient  Patient would benefit from continued PT to further increase scapular strength/stability, improve posture, and reduce pain  Plan: Continue per plan of care  Precautions:  PMHx: unremarkable  Dx: chronic R>L midtrap TrP secondary to postural dysfunction    Manuals           Gr V prone T3-8 HVLA x1 ea np np          Gr IV T3-8 PA mobs  1x10 ea np          Graston R rhomboid, MT, LT  8 min 8 min                       Neuro Re-Ed             Seated c/s retraction 2"x10 2"x15 2"x15          Seated c/s retraction with OP  2"x15 2"x15          scap retraction 5"x10 5"x10 5"x10          Prone I's 2x10 3x10 3x10          Prone T's  2x10 2x10          Prone ER             Prone thoracic extension  2x10 2x10          Ther Ex             Seated thoracic extension over chair 3"x10 3"x10 3"x10          Seated rhomboid/MT stretch 10"x3 10"x3 10"x3          Doorway pec stretch 10"x3 10"x3 10"x3                                                                           Ther Activity                                       Gait Training Modalities

## 2021-09-03 ENCOUNTER — OFFICE VISIT (OUTPATIENT)
Dept: PHYSICAL THERAPY | Facility: CLINIC | Age: 36
End: 2021-09-03
Payer: COMMERCIAL

## 2021-09-03 DIAGNOSIS — G89.29 CHRONIC SCAPULAR PAIN: Primary | ICD-10-CM

## 2021-09-03 DIAGNOSIS — M89.8X1 CHRONIC SCAPULAR PAIN: Primary | ICD-10-CM

## 2021-09-03 PROCEDURE — 97112 NEUROMUSCULAR REEDUCATION: CPT | Performed by: PHYSICAL THERAPIST

## 2021-09-03 PROCEDURE — 97140 MANUAL THERAPY 1/> REGIONS: CPT | Performed by: PHYSICAL THERAPIST

## 2021-09-03 PROCEDURE — 97110 THERAPEUTIC EXERCISES: CPT | Performed by: PHYSICAL THERAPIST

## 2021-09-03 NOTE — PROGRESS NOTES
Daily Note     Today's date: 9/3/2021  Patient name: Dewey Talamantes  : 1985  MRN: [id number]  Referring provider: Skip Moise PA-C  Dx:   Encounter Diagnosis     ICD-10-CM    1  Chronic scapular pain  M89 8X1     G89 29                   Subjective: Pt reports high pain levels while standing for a while at his son's football practice  He reports generalized B midscap soreness currently and also notes since IE  Objective: See treatment diary below    Assessment: Pt demonstrates limited postural endurance and R 7/8th rib hypomobility  Pt demonstrated a good initial response to rib mobs  Plan: Assess response nv  Precautions:  PMHx: unremarkable  Dx: chronic R>L midtrap TrP secondary to postural dysfunction    Manuals 8/19 8/26 9/1 9/3         Gr V prone T3-8 HVLA x1 ea np np np         Gr IV T3-8 PA mobs  1x10 ea np 1x30         Graston R rhomboid, MT, LT  8 min 8 min 8 min         R 7/8th rib PA mobs    Gr IV  3x30 ea         Neuro Re-Ed             Seated c/s retraction 2"x10 2"x15 2"x15 2"x15         Seated c/s retraction with OP  2"x15 2"x15 2"x15         scap retraction 5"x10 5"x10 5"x10 5"x10         Prone I's 2x10 3x10 3x10 3x12         Prone T's  2x10 2x10 2x12         Prone ER             bridges    3x10         Prone thoracic extension  2x10 2x10 2x10         Ther Ex             Seated thoracic extension over chair 3"x10 3"x10 3"x10 3"x10         Seated rhomboid/MT stretch 10"x3 10"x3 10"x3 10"x3         Doorway pec stretch 10"x3 10"x3 10"x3 10"x3                                                                          Ther Activity                                       Gait Training                                       Modalities

## 2021-09-08 ENCOUNTER — OFFICE VISIT (OUTPATIENT)
Dept: PHYSICAL THERAPY | Facility: CLINIC | Age: 36
End: 2021-09-08
Payer: COMMERCIAL

## 2021-09-08 DIAGNOSIS — G89.29 CHRONIC SCAPULAR PAIN: Primary | ICD-10-CM

## 2021-09-08 DIAGNOSIS — M89.8X1 CHRONIC SCAPULAR PAIN: Primary | ICD-10-CM

## 2021-09-08 PROCEDURE — 97110 THERAPEUTIC EXERCISES: CPT

## 2021-09-08 PROCEDURE — 97112 NEUROMUSCULAR REEDUCATION: CPT

## 2021-09-08 PROCEDURE — 97140 MANUAL THERAPY 1/> REGIONS: CPT

## 2021-09-08 NOTE — PROGRESS NOTES
Daily Note     Today's date: 2021  Patient name: Susan Roche  : 1985  MRN: [id number]  Referring provider: Tonya Pozo PA-C  Dx:   Encounter Diagnosis     ICD-10-CM    1  Chronic scapular pain  M89 8X1     G89 29                   Subjective: Pt reports having some relief following mobilizations performed LV, but relief did not last very long and quickly returned to baseline  Objective: See treatment diary below  Assessment: Tolerated treatment well  Continues to present with limitations in postural endurance  Trialled standing rhomboid stretch today with hands anchored against door jam; no really change in relief between standing or sitting  Improved mobility of R 7/8 rib by physical therapist, Wali Fuller, compared to LV  Patient would benefit from continued PT  Plan: Continue per plan of care  Precautions:  PMHx: unremarkable  Dx: chronic R>L midtrap TrP secondary to postural dysfunction    Manuals 8/19 8/26 9/1 9/3 9        Gr V prone T3-8 HVLA x1 ea np np np np        Gr IV T3-8 PA mobs  1x10 ea np 1x30 np        Graston R rhomboid, MT, LT  8 min 8 min 8 min 8 min        R 7/8th rib PA mobs    Gr IV  3x30 ea Gr IV  3x30 ea        Neuro Re-Ed             Seated c/s retraction 2"x10 2"x15 2"x15 2"x15 2"x15          Seated c/s retraction with OP  2"x15 2"x15 2"x15 2"x15        scap retraction 5"x10 5"x10 5"x10 5"x10 5"x10        Prone I's 2x10 3x10 3x10 3x12 3x12        Prone T's  2x10 2x10 2x12 2x12        Prone ER             bridges    3x10 3x10        Prone thoracic extension  2x10 2x10 2x10 2x10        Ther Ex             Seated thoracic extension over chair 3"x10 3"x10 3"x10 3"x10 3"x10        Seated rhomboid/MT stretch 10"x3 10"x3 10"x3 10"x3 10"x3  stand        Doorway pec stretch 10"x3 10"x3 10"x3 10"x3 10"x3                                                                         Ther Activity                                       Gait Training Modalities

## 2021-09-15 ENCOUNTER — APPOINTMENT (OUTPATIENT)
Dept: PHYSICAL THERAPY | Facility: CLINIC | Age: 36
End: 2021-09-15
Payer: COMMERCIAL

## 2021-09-17 ENCOUNTER — OFFICE VISIT (OUTPATIENT)
Dept: PHYSICAL THERAPY | Facility: CLINIC | Age: 36
End: 2021-09-17
Payer: COMMERCIAL

## 2021-09-17 DIAGNOSIS — M89.8X1 CHRONIC SCAPULAR PAIN: Primary | ICD-10-CM

## 2021-09-17 DIAGNOSIS — G89.29 CHRONIC SCAPULAR PAIN: Primary | ICD-10-CM

## 2021-09-17 PROCEDURE — 97110 THERAPEUTIC EXERCISES: CPT

## 2021-09-17 PROCEDURE — 97140 MANUAL THERAPY 1/> REGIONS: CPT

## 2021-09-17 PROCEDURE — 97112 NEUROMUSCULAR REEDUCATION: CPT

## 2021-09-17 NOTE — PROGRESS NOTES
Daily Note     Today's date: 2021  Patient name: Lanre Epps  : 1985  MRN: [id number]  Referring provider: Daphne Del Rio PA-C  Dx:   Encounter Diagnosis     ICD-10-CM    1  Chronic scapular pain  M89 8X1     G89 29                   Subjective: Pt reports he road his motorcycle round trip to [facility] twice this past weekend, which caused significant increase in symptoms  States rest and lying down helps with relieving symptoms  Any sort of activity or even just sitting in the car slowly worsens symptoms throughout the day  Objective: See treatment diary below      Assessment: Tolerated treatment well  Continues to present with limited postural endurance with daily activities  Moderate soft tissue restriction and increased tone along R MT which did begin to resolve slightly with manual IASTM  Patient would benefit from continued PT to further increase strength, improve posture, and reduce pain/frequency of symptoms  Plan: Continue per plan of care  Precautions:  PMHx: unremarkable  Dx: chronic R>L midtrap TrP secondary to postural dysfunction    Manuals 8/19 8/26 9/1 9/3 9/8 9/17       Gr V prone T3-8 HVLA x1 ea np np np np np       Gr IV T3-8 PA mobs  1x10 ea np 1x30 np np       Graston R rhomboid, MT, LT  8 min 8 min 8 min 8 min 8 min       R 7/8th rib PA mobs    Gr IV  3x30 ea Gr IV  3x30 ea nv       Neuro Re-Ed             Seated c/s retraction 2"x10 2"x15 2"x15 2"x15 2"x15   2"x15       Seated c/s retraction with OP  2"x15 2"x15 2"x15 2"x15 2"x15       scap retraction 5"x10 5"x10 5"x10 5"x10 5"x10 5"x10       Prone I's 2x10 3x10 3x10 3x12 3x12 3x12       Prone T's  2x10 2x10 2x12 2x12 2x12       Prone ER             bridges    3x10 3x10 3x15       Prone thoracic extension  2x10 2x10 2x10 2x10 2x10       Ther Ex             Seated thoracic extension over chair 3"x10 3"x10 3"x10 3"x10 3"x10 3"x10       Seated rhomboid/MT stretch 10"x3 10"x3 10"x3 10"x3 10"x3  stand 10"x3 seated       Doorway pec stretch 10"x3 10"x3 10"x3 10"x3 10"x3 10"x3                                                                        Ther Activity                                       Gait Training                                       Modalities

## 2021-09-21 ENCOUNTER — APPOINTMENT (OUTPATIENT)
Dept: PHYSICAL THERAPY | Facility: CLINIC | Age: 36
End: 2021-09-21
Payer: COMMERCIAL

## 2021-09-23 ENCOUNTER — APPOINTMENT (OUTPATIENT)
Dept: PHYSICAL THERAPY | Facility: CLINIC | Age: 36
End: 2021-09-23
Payer: COMMERCIAL

## 2022-01-20 ENCOUNTER — APPOINTMENT (OUTPATIENT)
Dept: LAB | Facility: AMBULARY SURGERY CENTER | Age: 37
End: 2022-01-20
Payer: COMMERCIAL

## 2022-01-20 ENCOUNTER — OFFICE VISIT (OUTPATIENT)
Dept: GASTROENTEROLOGY | Facility: AMBULARY SURGERY CENTER | Age: 37
End: 2022-01-20
Payer: COMMERCIAL

## 2022-01-20 VITALS
BODY MASS INDEX: 42.36 KG/M2 | WEIGHT: 286 LBS | HEIGHT: 69 IN | DIASTOLIC BLOOD PRESSURE: 82 MMHG | SYSTOLIC BLOOD PRESSURE: 132 MMHG

## 2022-01-20 DIAGNOSIS — R10.31 RLQ ABDOMINAL PAIN: ICD-10-CM

## 2022-01-20 DIAGNOSIS — R14.0 BLOATING: ICD-10-CM

## 2022-01-20 DIAGNOSIS — R14.0 BLOATING: Primary | ICD-10-CM

## 2022-01-20 DIAGNOSIS — R10.11 RUQ PAIN: ICD-10-CM

## 2022-01-20 LAB
ALBUMIN SERPL BCP-MCNC: 4.1 G/DL (ref 3.5–5)
ALP SERPL-CCNC: 61 U/L (ref 46–116)
ALT SERPL W P-5'-P-CCNC: 43 U/L (ref 12–78)
ANION GAP SERPL CALCULATED.3IONS-SCNC: 4 MMOL/L (ref 4–13)
AST SERPL W P-5'-P-CCNC: 16 U/L (ref 5–45)
BASOPHILS # BLD AUTO: 0.11 THOUSANDS/ΜL (ref 0–0.1)
BASOPHILS NFR BLD AUTO: 1 % (ref 0–1)
BILIRUB SERPL-MCNC: 0.69 MG/DL (ref 0.2–1)
BUN SERPL-MCNC: 9 MG/DL (ref 5–25)
CALCIUM SERPL-MCNC: 9.4 MG/DL (ref 8.3–10.1)
CHLORIDE SERPL-SCNC: 108 MMOL/L (ref 100–108)
CO2 SERPL-SCNC: 27 MMOL/L (ref 21–32)
CREAT SERPL-MCNC: 0.89 MG/DL (ref 0.6–1.3)
EOSINOPHIL # BLD AUTO: 0.3 THOUSAND/ΜL (ref 0–0.61)
EOSINOPHIL NFR BLD AUTO: 3 % (ref 0–6)
ERYTHROCYTE [DISTWIDTH] IN BLOOD BY AUTOMATED COUNT: 12.7 % (ref 11.6–15.1)
GFR SERPL CREATININE-BSD FRML MDRD: 110 ML/MIN/1.73SQ M
GLUCOSE P FAST SERPL-MCNC: 87 MG/DL (ref 65–99)
HCT VFR BLD AUTO: 51.5 % (ref 36.5–49.3)
HGB BLD-MCNC: 17.2 G/DL (ref 12–17)
IMM GRANULOCYTES # BLD AUTO: 0.05 THOUSAND/UL (ref 0–0.2)
IMM GRANULOCYTES NFR BLD AUTO: 1 % (ref 0–2)
LYMPHOCYTES # BLD AUTO: 3.37 THOUSANDS/ΜL (ref 0.6–4.47)
LYMPHOCYTES NFR BLD AUTO: 34 % (ref 14–44)
MCH RBC QN AUTO: 30.1 PG (ref 26.8–34.3)
MCHC RBC AUTO-ENTMCNC: 33.4 G/DL (ref 31.4–37.4)
MCV RBC AUTO: 90 FL (ref 82–98)
MONOCYTES # BLD AUTO: 0.6 THOUSAND/ΜL (ref 0.17–1.22)
MONOCYTES NFR BLD AUTO: 6 % (ref 4–12)
NEUTROPHILS # BLD AUTO: 5.47 THOUSANDS/ΜL (ref 1.85–7.62)
NEUTS SEG NFR BLD AUTO: 55 % (ref 43–75)
NRBC BLD AUTO-RTO: 0 /100 WBCS
PLATELET # BLD AUTO: 263 THOUSANDS/UL (ref 149–390)
PMV BLD AUTO: 9.9 FL (ref 8.9–12.7)
POTASSIUM SERPL-SCNC: 3.8 MMOL/L (ref 3.5–5.3)
PROT SERPL-MCNC: 8 G/DL (ref 6.4–8.2)
RBC # BLD AUTO: 5.71 MILLION/UL (ref 3.88–5.62)
SODIUM SERPL-SCNC: 139 MMOL/L (ref 136–145)
TSH SERPL DL<=0.05 MIU/L-ACNC: 1.23 UIU/ML (ref 0.36–3.74)
WBC # BLD AUTO: 9.9 THOUSAND/UL (ref 4.31–10.16)

## 2022-01-20 PROCEDURE — 86231 EMA EACH IG CLASS: CPT

## 2022-01-20 PROCEDURE — 86677 HELICOBACTER PYLORI ANTIBODY: CPT

## 2022-01-20 PROCEDURE — 86364 TISS TRNSGLTMNASE EA IG CLAS: CPT

## 2022-01-20 PROCEDURE — 80053 COMPREHEN METABOLIC PANEL: CPT

## 2022-01-20 PROCEDURE — 84443 ASSAY THYROID STIM HORMONE: CPT

## 2022-01-20 PROCEDURE — 36415 COLL VENOUS BLD VENIPUNCTURE: CPT

## 2022-01-20 PROCEDURE — 86258 DGP ANTIBODY EACH IG CLASS: CPT

## 2022-01-20 PROCEDURE — 85025 COMPLETE CBC W/AUTO DIFF WBC: CPT

## 2022-01-20 PROCEDURE — 99204 OFFICE O/P NEW MOD 45 MIN: CPT | Performed by: PHYSICIAN ASSISTANT

## 2022-01-20 PROCEDURE — 82784 ASSAY IGA/IGD/IGG/IGM EACH: CPT

## 2022-01-20 RX ORDER — ACYCLOVIR 800 MG/1
TABLET ORAL
COMMUNITY
Start: 2021-12-01

## 2022-01-20 RX ORDER — DOCOSANOL 100 MG/G
CREAM TOPICAL
COMMUNITY
Start: 2021-12-01

## 2022-01-20 RX ORDER — ONDANSETRON 4 MG/1
TABLET, ORALLY DISINTEGRATING ORAL
COMMUNITY
Start: 2021-12-18

## 2022-01-20 RX ORDER — METHYLPREDNISOLONE 4 MG/1
TABLET ORAL
COMMUNITY
Start: 2021-12-20

## 2022-01-20 RX ORDER — TIZANIDINE 4 MG/1
TABLET ORAL
COMMUNITY
Start: 2021-11-09

## 2022-01-20 NOTE — PROGRESS NOTES
[facility] - Outpatient Consultation  Amanda Rodriguez 29 y o  male MRN: [id number]  Encounter: [id number]          ASSESSMENT AND PLAN:      1  Bloating  Reports upper abdominal bloating for the past few months  No association with food intake  Reports bowel movements are relatively normal without any significant constipation or diarrhea  No improvement with Gas-X  No frequent NSAIDs  Weight stable  Possibly secondary to gastritis versus H pylori infection versus celiac versus SIBO  -obtain CBC, CMP, TSH, celiac and H pylori testing     -start Prilosec 40 milligrams daily in the morning before breakfast     -recommend trial of peppermint oil supplement     -patient reports getting MRI for back recently and reports provider did not see any abnormalities of abdomen  Unfortunately, these reports are not available to me  I recommended that patient try to obtain reports improvement next follow-up for review     -for now, will obtain abdominal ultrasound     -if any abnormalities with MRI or if MRI did not significantly assess abdomen, would consider CT scan at next office visit   -if no improvement and negative workup, could consider pursuing EGD and colonoscopy as well as Xifaxan trial for SIBO  2  RLQ discomfort  Patient reports right lower quadrant discomfort which is always present upon wakening  Reports it is relatively dull and comes and goes  Occasional urgency with bowel movements however has 1-3 daily without difficulty  No blood in stool  No recent lab work  Possibly functional in the setting of irritable bowel syndrome     -obtain abdominal ultrasound for now     -patient will try to obtain previous MRI results    Consider CT scan at next office visit if any abnormalities or if MRI did not assess abdomen well     -obtain lab work as noted above     -trial of peppermint oil as noted above for bloating as well  -consider trial of Bentyl at next office visit if no June 9, 2017      Ronny Driscoll MD  [address] [facility]  [address]  [place] [address]           [facility]  [address] [facility]  [address]  [place] [address]  Phone: [phone]  Fax: [phone]          Patient: Naresh Painting   MR Number: [id number]   YOB: 1948   Date of Visit: 6/9/2017       Dear Dr. Ronny Driscoll:    Thank you for referring Naresh Painting to me for evaluation. Attached you will find relevant portions of my assessment and plan of care.    If you have questions, please do not hesitate to call me. I look forward to following Naresh Painting along with you.    Sincerely,    Fidencio Rdz MD    Enclosure  CC:  No Recipients    If you would like to receive this communication electronically, please contact [email or web address] or [phone] to request more information on [employer] Link access.    For providers and/or their staff who would like to refer a patient to [facility], please contact us through our one-stop-shop provider referral line, [facility], at [phone].    If you feel you have received this communication in error or would no longer like to receive these types of communications, please e-mail [email or web address]          improvement        Patient will follow up in 2-3 months     ______________________________________________________________________    HPI:      Mary Garvin is a 67-year-old male with no significant past medical history who presents as a new patient for bloating  Patient reports symptoms of bloating and occasional right lower quadrant discomfort for the past few months  Reports does not seem to be associated with any specific foods  He reports right lower quadrant pain is present upon awakening  He describes it is a pressure which comes and goes  No association with bowel movements or eating  He reports bowel movements are regular and has 1-3 a day  Occasional urgency  Denies any blood in the stool  Weight is stable  Recent biotics  No significant changes in diet  Denies smoking or alcohol use  No family history of known celiac disease, IBD  Reports cousin on mother's side had colon cancer as well cystic fibrosis  No prior abdominal surgeries  No prior EGD or colonoscopy  Patient reports significant back pain as well for which she is following with a chiropractor  He reports having MRI likely of the thoracic spine recently  He reports he asked his provider if any internal abdominal organs looked abnormal which she believes they did not  No report is available for me to review in the chart  REVIEW OF SYSTEMS:    CONSTITUTIONAL: Denies any fever, chills, rigors, and weight loss  HEENT: No earache or tinnitus  Denies hearing loss or visual disturbances  CARDIOVASCULAR: No chest pain or palpitations  RESPIRATORY: Denies any cough, hemoptysis, shortness of breath or dyspnea on exertion  GASTROINTESTINAL: As noted in the History of Present Illness  GENITOURINARY: No problems with urination  Denies any hematuria or dysuria  NEUROLOGIC: No dizziness or vertigo, denies headaches  MUSCULOSKELETAL: +back pain   SKIN: Denies skin rashes or itching     ENDOCRINE: Denies excessive thirst  Denies intolerance to heat or cold  PSYCHOSOCIAL: Denies depression or anxiety  Denies any recent memory loss  Historical Information   History reviewed  No pertinent past medical history  Past Surgical History:   Procedure Laterality Date    NASAL/SINUS ENDOSCOPY N/A 2/13/2019    Procedure: REVISION ENDOSCOPIC POSTERIOR SEPTOPLASTY AND LYSIS INTRANASAL ADHESIONS;  Surgeon: Eric Reid MD;  Location: [facility];  Service: ENT    SEPTOPLASTY      WISDOM TOOTH EXTRACTION       Social History   Social History     Substance and Sexual Activity   Alcohol Use Yes    Comment: Rare      Social History     Substance and Sexual Activity   Drug Use No     Social History     Tobacco Use   Smoking Status Former Smoker    Types: Cigars   Smokeless Tobacco Never Used     Family History   Problem Relation Age of Onset    No Known Problems Mother     No Known Problems Father     Colon cancer Cousin        Meds/Allergies       Current Outpatient Medications:     acyclovir (ZOVIRAX) 800 mg tablet    Diclofenac Epolamine 1 3 % PTCH    docosanol (ABREVA) 10 %    methylPREDNISolone 4 MG tablet therapy pack    mupirocin (BACTROBAN) 2 % ointment    tiZANidine (ZANAFLEX) 4 mg tablet    Allergies   Allergen Reactions    Other Anaphylaxis     Bee stings stepped on nest as child    Amoxicillin      Childhood allergy    Pollen Extract Other (See Comments)           Objective     Blood pressure 132/82, height 5' 9" (1 753 m), weight 130 kg (286 lb)  Body mass index is 42 23 kg/m²  PHYSICAL EXAM:      General Appearance:   Alert, cooperative, no distress   HEENT:   Normocephalic, atraumatic, anicteric      Neck:  Supple, symmetrical, trachea midline   Lungs:   Clear to auscultation bilaterally; no rales, rhonchi or wheezing; respirations unlabored    Heart[de-identified]   Regular rate and rhythm; no murmur, rub, or gallop     Abdomen:   Soft, non-tender, non-distended; normal bowel sounds; no masses, no organomegaly    Genitalia:   Deferred    Rectal:   Deferred    Extremities:  No cyanosis, clubbing or edema    Pulses:  2+ and symmetric    Skin:  No jaundice, rashes, or lesions    Lymph nodes:  No palpable cervical lymphadenopathy        Lab Results:   No visits with results within 1 Day(s) from this visit     Latest known visit with results is:   Admission on 08/29/2019, Discharged on 08/30/2019   Component Date Value    WBC 08/29/2019 10 72*    RBC 08/29/2019 5 67*    Hemoglobin 08/29/2019 17 1*    Hematocrit 08/29/2019 50 4*    MCV 08/29/2019 89     MCH 08/29/2019 30 2     MCHC 08/29/2019 33 9     RDW 08/29/2019 12 1     MPV 08/29/2019 9 9     Platelets 41/10/6063 219     nRBC 08/29/2019 0     Neutrophils Relative 08/29/2019 71     Immat GRANS % 08/29/2019 1     Lymphocytes Relative 08/29/2019 19     Monocytes Relative 08/29/2019 7     Eosinophils Relative 08/29/2019 1     Basophils Relative 08/29/2019 1     Neutrophils Absolute 08/29/2019 7 69*    Immature Grans Absolute 08/29/2019 0 06     Lymphocytes Absolute 08/29/2019 2 04     Monocytes Absolute 08/29/2019 0 70     Eosinophils Absolute 08/29/2019 0 15     Basophils Absolute 08/29/2019 0 08     Sodium 08/29/2019 140     Potassium 08/29/2019 4 0     Chloride 08/29/2019 104     CO2 08/29/2019 30     ANION GAP 08/29/2019 6     BUN 08/29/2019 12     Creatinine 08/29/2019 1 09     Glucose 08/29/2019 110     Calcium 08/29/2019 9 3     AST 08/29/2019 19     ALT 08/29/2019 50     Alkaline Phosphatase 08/29/2019 80     Total Protein 08/29/2019 8 3*    Albumin 08/29/2019 4 5     Total Bilirubin 08/29/2019 0 66     eGFR 08/29/2019 89     Protime 08/29/2019 13 5     INR 08/29/2019 1 07     PTT 08/29/2019 28     Color, UA 08/30/2019 Yellow     Clarity, UA 08/30/2019 Clear     Specific Gravity, UA 08/30/2019 1 018     pH, UA 08/30/2019 6 5     Leukocytes, UA 08/30/2019 Negative     Nitrite, UA 08/30/2019 Negative     Protein, UA 08/30/2019 Negative     Glucose, UA 08/30/2019 Negative     Ketones, UA 08/30/2019 Negative     Urobilinogen, UA 08/30/2019 1 0     Bilirubin, UA 08/30/2019 Negative     Blood, UA 08/30/2019 Negative     N gonorrhoeae, DNA Probe 08/30/2019 Negative     Chlamydia trachomatis, D* 08/30/2019 Negative     Ventricular Rate 08/30/2019 99     Atrial Rate 08/30/2019 99     MD Interval 08/30/2019 160     QRSD Interval 08/30/2019 92     QT Interval 08/30/2019 322     QTC Interval 08/30/2019 413     P Axis 08/30/2019 59     QRS Axis 08/30/2019 -1     T Wave Axis 08/30/2019 51          Radiology Results:   No results found

## 2022-01-20 NOTE — PATIENT INSTRUCTIONS
Start taking omeprazole (Prilseoc) 40 mg daily in the morning before breakfast      You can try peppermint oil supplement (IBgard)

## 2022-01-21 LAB
ENDOMYSIUM IGA SER QL: NEGATIVE
GLIADIN PEPTIDE IGA SER-ACNC: 5 UNITS (ref 0–19)
GLIADIN PEPTIDE IGG SER-ACNC: 3 UNITS (ref 0–19)
H PYLORI IGG SER IA-ACNC: 0.3 INDEX VALUE (ref 0–0.79)
H PYLORI IGM SER-ACNC: <9 UNITS (ref 0–8.9)
IGA SERPL-MCNC: 213 MG/DL (ref 90–386)
TTG IGA SER-ACNC: <2 U/ML (ref 0–3)
TTG IGG SER-ACNC: <2 U/ML (ref 0–5)

## 2022-01-26 ENCOUNTER — HOSPITAL ENCOUNTER (OUTPATIENT)
Dept: ULTRASOUND IMAGING | Facility: HOSPITAL | Age: 37
Discharge: HOME | End: 2022-01-26
Payer: COMMERCIAL

## 2022-01-26 DIAGNOSIS — R14.0 BLOATING: ICD-10-CM

## 2022-01-26 DIAGNOSIS — R10.31 RLQ ABDOMINAL PAIN: ICD-10-CM

## 2022-01-26 PROCEDURE — 76700 US EXAM ABDOM COMPLETE: CPT

## 2022-05-12 ENCOUNTER — OFFICE VISIT (OUTPATIENT)
Dept: GASTROENTEROLOGY | Facility: AMBULARY SURGERY CENTER | Age: 37
End: 2022-05-12
Payer: COMMERCIAL

## 2022-05-12 ENCOUNTER — TELEPHONE (OUTPATIENT)
Dept: GASTROENTEROLOGY | Facility: AMBULARY SURGERY CENTER | Age: 37
End: 2022-05-12

## 2022-05-12 VITALS
HEART RATE: 82 BPM | WEIGHT: 291 LBS | DIASTOLIC BLOOD PRESSURE: 80 MMHG | SYSTOLIC BLOOD PRESSURE: 128 MMHG | BODY MASS INDEX: 43.1 KG/M2 | HEIGHT: 69 IN | OXYGEN SATURATION: 98 %

## 2022-05-12 DIAGNOSIS — R19.8 IRREGULAR BOWEL HABITS: Primary | ICD-10-CM

## 2022-05-12 DIAGNOSIS — R10.31 RLQ ABDOMINAL PAIN: ICD-10-CM

## 2022-05-12 DIAGNOSIS — R14.0 BLOATING: ICD-10-CM

## 2022-05-12 PROCEDURE — 99214 OFFICE O/P EST MOD 30 MIN: CPT | Performed by: PHYSICIAN ASSISTANT

## 2022-05-12 RX ORDER — OMEPRAZOLE 20 MG/1
20 CAPSULE, DELAYED RELEASE ORAL DAILY
COMMUNITY
End: 2022-05-12 | Stop reason: ALTCHOICE

## 2022-05-12 RX ORDER — ALBUTEROL SULFATE 90 UG/1
AEROSOL, METERED RESPIRATORY (INHALATION)
COMMUNITY
Start: 2022-03-01

## 2022-05-12 RX ORDER — PANTOPRAZOLE SODIUM 40 MG/1
40 TABLET, DELAYED RELEASE ORAL DAILY
Qty: 90 TABLET | Refills: 1 | Status: SHIPPED | OUTPATIENT
Start: 2022-05-12 | End: 2022-11-08

## 2022-05-12 RX ORDER — SODIUM PICOSULFATE, MAGNESIUM OXIDE, AND ANHYDROUS CITRIC ACID 10; 3.5; 12 MG/160ML; G/160ML; G/160ML
LIQUID ORAL
Qty: 320 ML | Refills: 0 | Status: SHIPPED | OUTPATIENT
Start: 2022-05-12 | End: 2022-07-01 | Stop reason: ALTCHOICE

## 2022-05-12 NOTE — PATIENT INSTRUCTIONS
Scheduled date of EGD/colonoscopy (as of today): 7/1/2022  Physician performing EGD/colonoscopy: Dr Neo Franz  Location of EGD/colonoscopy:  [address]  Desired bowel prep reviewed with patient: Clenpiq  Instructions reviewed with patient by: Rayna Sample  Clearances:   None

## 2022-05-12 NOTE — PROGRESS NOTES
Follow-up Note -  [facility]  Amber Monsalve 1985 40 y o  male         Reason:  Follow-up; bloating, lower abdominal pain    HPI:  69-year-old male with no reported significant medical history, BMI 42 who presents for follow-up; he saw our physician assistant Hong Kimble in the office a few months ago in January for evaluation of a few months of symptoms of bloating and right lower quadrant discomfort  He has never had EGD or colonoscopy before  He was ordered for celiac disease profile, H pylori testing, other lab work including CBC and TSH, and a trial Prilosec daily and peppermint oil, and to follow-up in a few months  Celiac disease profile is found negative, H pylori testing negative  He also underwent a right upper quadrant ultrasound showing some fatty liver but no evidence of any gallbladder abnormality  Patient says he is still feeling about the same at this point, he says that he took omeprazole for a while and he said that it did help for a short time but his symptoms began to come back fairly soon after  He says he often will wake up with pain in his right lower quadrant, he says this seems random and does not happen every morning, he does not notice any specific correlation with defecation or with food intake  He says there is some variability in his stool consistency and he will sometimes have frequent loose stools  He denies any rectal bleeding or melena  Does not take any fiber supplementation currently  He reports his uncle and cousin both had colon cancer  REVIEW OF SYSTEMS:      CONSTITUTIONAL: Denies any fever, chills, or rigors  Good appetite, and no recent weight loss  HEENT: No earache or tinnitus  Denies hearing loss or visual disturbances  CARDIOVASCULAR: No chest pain or palpitations  RESPIRATORY: Denies any cough, hemoptysis, shortness of breath or dyspnea on exertion  GASTROINTESTINAL: As noted in the History of Present Illness  GENITOURINARY: No problems with urination  Denies any hematuria or dysuria  NEUROLOGIC: No dizziness or vertigo, denies headaches  MUSCULOSKELETAL: Denies any muscle or joint pain  SKIN: Denies skin rashes or itching  ENDOCRINE: Denies excessive thirst  Denies intolerance to heat or cold  PSYCHOSOCIAL: Denies depression or anxiety  Denies any recent memory loss  History reviewed  No pertinent past medical history     Past Surgical History:   Procedure Laterality Date    NASAL/SINUS ENDOSCOPY N/A 2/13/2019    Procedure: REVISION ENDOSCOPIC POSTERIOR SEPTOPLASTY AND LYSIS INTRANASAL ADHESIONS;  Surgeon: Vivek Moreau MD;  Location: [facility];  Service: ENT    SEPTOPLASTY      WISDOM TOOTH EXTRACTION       Social History     Socioeconomic History    Marital status: /Civil Union     Spouse name: Not on file    Number of children: Not on file    Years of education: Not on file    Highest education level: Not on file   Occupational History    Not on file   Tobacco Use    Smoking status: Former Smoker     Types: Cigars    Smokeless tobacco: Never Used   Substance and Sexual Activity    Alcohol use: Yes     Comment: Rare     Drug use: No    Sexual activity: Yes   Other Topics Concern    Not on file   Social History Narrative    Not on file     Social Determinants of Health     Financial Resource Strain: Not on file   Food Insecurity: Not on file   Transportation Needs: Not on file   Physical Activity: Not on file   Stress: Not on file   Social Connections: Not on file   Intimate Partner Violence: Not on file   Housing Stability: Not on file     Family History   Problem Relation Age of Onset    No Known Problems Mother     No Known Problems Father     Colon cancer Cousin      Other, Amoxicillin, and Pollen extract  Current Outpatient Medications   Medication Sig Dispense Refill    Multiple Vitamins-Minerals (MULTIVITAMIN ADULTS PO) Take by mouth      pantoprazole (PROTONIX) 40 mg tablet Take 1 tablet (40 mg total) by mouth in the morning  90 tablet 1    Sod Picosulfate-Mag Ox-Cit Acd (Clenpiq) 10-3 5-12 MG-GM -GM/160ML SOLN Please administer according to instructions provided by our office 320 mL 0    acyclovir (ZOVIRAX) 800 mg tablet  (Patient not taking: No sig reported)      albuterol (PROVENTIL HFA,VENTOLIN HFA) 90 mcg/act inhaler TAKE 2 PUFFS BY MOUTH EVERY 4 HOURS AS NEEDED FOR WHEEZE (Patient not taking: Reported on 5/12/2022)      Diclofenac Epolamine 1 3 % PTCH  (Patient not taking: No sig reported)      docosanol (ABREVA) 10 % Apply to affected area bid for 5 days (Patient not taking: No sig reported)      methylPREDNISolone 4 MG tablet therapy pack  (Patient not taking: No sig reported)      mupirocin (BACTROBAN) 2 % ointment  (Patient not taking: No sig reported)      tiZANidine (ZANAFLEX) 4 mg tablet  (Patient not taking: No sig reported)       No current facility-administered medications for this visit  Blood pressure 128/80, pulse 82, height 5' 9" (1 753 m), weight 132 kg (291 lb), SpO2 98 %  PHYSICAL EXAM:      General Appearance:   Alert, cooperative, no distress, appears stated age    HEENT:   Normocephalic, atraumatic, anicteric      Neck:  Supple, symmetrical, trachea midline, no adenopathy;    thyroid: no enlargement/tenderness/nodules; no carotid  bruit or JVD    Lungs:   Clear to auscultation bilaterally; no rales, rhonchi or wheezing; respirations unlabored    Heart[de-identified]   S1 and S2 normal; regular rate and rhythm; no murmur, rub, or gallop     Abdomen:   Soft, non-tender, non-distended; normal bowel sounds; no masses, no organomegaly    Extremities: No edema, erythema, wounds, rashes   Rectal:   Deferred                      Lab Results   Component Value Date    WBC 9 90 01/20/2022    HGB 17 2 (H) 01/20/2022    HCT 51 5 (H) 01/20/2022    MCV 90 01/20/2022     01/20/2022     Lab Results   Component Value Date    GLUCOSE 115 03/19/2018    CALCIUM 9 4 01/20/2022    K 3 8 01/20/2022    CO2 27 01/20/2022     01/20/2022    BUN 9 01/20/2022    CREATININE 0 89 01/20/2022     Lab Results   Component Value Date    ALT 43 01/20/2022    AST 16 01/20/2022    ALKPHOS 61 01/20/2022     Lab Results   Component Value Date    INR 1 07 08/29/2019    INR 0 98 03/19/2018    PROTIME 13 5 08/29/2019    PROTIME 13 0 03/19/2018       US abdomen complete with doppler    Result Date: 1/27/2022  Impression: Mild hepatomegaly and findings suggesting moderate steatosis  No acute abnormality  Workstation performed: [id number]       ASSESSMENT & PLAN:    Irregular bowel habits  Longstanding bloating symptoms, intermittent lower abdominal pain, and mild irregularity of the stool consistency  Celiac panel and H pylori testing have returned negative, right upper quadrant ultrasound showed no evidence of symptomatic cholelithiasis    Suspect functional dyspepsia and/or bowel spasm, but should rule out underlying gastritis/peptic ulcer disease, early inflammatory bowel disease     -will plan for EGD and colonoscopy     -procedures were explained in detail to the patient at this time including associated risks and benefits, risks including but not limited to infection, perforation bleeding     -prescription and instructions were provided for colonoscopy prep     -as patient reported transient benefit with low-dose omeprazole, and reported that there were issues with insurance coverage with the medication due to over-the-counter availability, we will prescribe pantoprazole 40 mg once daily for the meantime, and make adjustments depending on his clinical course and findings from the EGD    -also advised patient we can trial an antispasmodic such as Bentyl in case his pain could be related to bowel spasm, patient expressed preference to wait until endoscopic workup is done, I think this is reasonable     -can also consider a soluble fiber supplement such as Citrucel in the meantime to help mitigate stool consistency and also minimize intraluminal gas distention and potential for associated bowel spasm

## 2022-05-12 NOTE — TELEPHONE ENCOUNTER
Patient is scheduled for colonoscopy and EGD on July 1 , 2022 at [facility]  with Munir Ashley MD  Patient is aware of pre-procedure prep of Clenpiq and they will be called the day prior between 2 and 6 pm for time to report for procedure  Pre-procedure prep has been given to the patient  in person  on May 12, 2022

## 2022-05-12 NOTE — ASSESSMENT & PLAN NOTE
Longstanding bloating symptoms, intermittent lower abdominal pain, and mild irregularity of the stool consistency  Celiac panel and H pylori testing have returned negative, right upper quadrant ultrasound showed no evidence of symptomatic cholelithiasis    Suspect functional dyspepsia and/or bowel spasm, but should rule out underlying gastritis/peptic ulcer disease, early inflammatory bowel disease     -will plan for EGD and colonoscopy     -procedures were explained in detail to the patient at this time including associated risks and benefits, risks including but not limited to infection, perforation bleeding     -prescription and instructions were provided for colonoscopy prep     -as patient reported transient benefit with low-dose omeprazole, and reported that there were issues with insurance coverage with the medication due to over-the-counter availability, we will prescribe pantoprazole 40 mg once daily for the meantime, and make adjustments depending on his clinical course and findings from the EGD    -also advised patient we can trial an antispasmodic such as Bentyl in case his pain could be related to bowel spasm, patient expressed preference to wait until endoscopic workup is done, I think this is reasonable     -can also consider a soluble fiber supplement such as Citrucel in the meantime to help mitigate stool consistency and also minimize intraluminal gas distention and potential for associated bowel spasm

## 2022-06-27 ENCOUNTER — ANESTHESIA (OUTPATIENT)
Dept: ANESTHESIOLOGY | Facility: HOSPITAL | Age: 37
End: 2022-06-27

## 2022-06-27 ENCOUNTER — ANESTHESIA EVENT (OUTPATIENT)
Dept: ANESTHESIOLOGY | Facility: HOSPITAL | Age: 37
End: 2022-06-27

## 2022-07-01 ENCOUNTER — ANESTHESIA (OUTPATIENT)
Dept: GASTROENTEROLOGY | Facility: AMBULARY SURGERY CENTER | Age: 37
End: 2022-07-01

## 2022-07-01 ENCOUNTER — HOSPITAL ENCOUNTER (OUTPATIENT)
Dept: GASTROENTEROLOGY | Facility: AMBULARY SURGERY CENTER | Age: 37
Setting detail: OUTPATIENT SURGERY
Discharge: HOME | End: 2022-07-01
Attending: INTERNAL MEDICINE | Admitting: INTERNAL MEDICINE
Payer: COMMERCIAL

## 2022-07-01 ENCOUNTER — ANESTHESIA EVENT (OUTPATIENT)
Dept: GASTROENTEROLOGY | Facility: AMBULARY SURGERY CENTER | Age: 37
End: 2022-07-01

## 2022-07-01 VITALS
RESPIRATION RATE: 12 BRPM | BODY MASS INDEX: 40.73 KG/M2 | HEART RATE: 75 BPM | SYSTOLIC BLOOD PRESSURE: 135 MMHG | TEMPERATURE: 97.1 F | OXYGEN SATURATION: 95 % | HEIGHT: 69 IN | DIASTOLIC BLOOD PRESSURE: 80 MMHG | WEIGHT: 275 LBS

## 2022-07-01 DIAGNOSIS — R14.0 BLOATING: ICD-10-CM

## 2022-07-01 DIAGNOSIS — R10.31 RLQ ABDOMINAL PAIN: ICD-10-CM

## 2022-07-01 DIAGNOSIS — R19.8 IRREGULAR BOWEL HABITS: ICD-10-CM

## 2022-07-01 PROCEDURE — 88305 TISSUE EXAM BY PATHOLOGIST: CPT | Performed by: PATHOLOGY

## 2022-07-01 PROCEDURE — 45380 COLONOSCOPY AND BIOPSY: CPT | Performed by: INTERNAL MEDICINE

## 2022-07-01 PROCEDURE — 43239 EGD BIOPSY SINGLE/MULTIPLE: CPT | Performed by: INTERNAL MEDICINE

## 2022-07-01 RX ORDER — PROPOFOL 10 MG/ML
INJECTION, EMULSION INTRAVENOUS AS NEEDED
Status: DISCONTINUED | OUTPATIENT
Start: 2022-07-01 | End: 2022-07-01

## 2022-07-01 RX ORDER — LIDOCAINE HYDROCHLORIDE 20 MG/ML
INJECTION, SOLUTION EPIDURAL; INFILTRATION; INTRACAUDAL; PERINEURAL AS NEEDED
Status: DISCONTINUED | OUTPATIENT
Start: 2022-07-01 | End: 2022-07-01

## 2022-07-01 RX ORDER — PHENTERMINE HYDROCHLORIDE 15 MG/1
15 CAPSULE ORAL EVERY MORNING
COMMUNITY

## 2022-07-01 RX ORDER — SODIUM CHLORIDE, SODIUM LACTATE, POTASSIUM CHLORIDE, CALCIUM CHLORIDE 600; 310; 30; 20 MG/100ML; MG/100ML; MG/100ML; MG/100ML
INJECTION, SOLUTION INTRAVENOUS CONTINUOUS PRN
Status: DISCONTINUED | OUTPATIENT
Start: 2022-07-01 | End: 2022-07-01

## 2022-07-01 RX ORDER — FENTANYL CITRATE 50 UG/ML
INJECTION, SOLUTION INTRAMUSCULAR; INTRAVENOUS AS NEEDED
Status: DISCONTINUED | OUTPATIENT
Start: 2022-07-01 | End: 2022-07-01

## 2022-07-01 RX ADMIN — FENTANYL CITRATE 50 MCG: 50 INJECTION, SOLUTION INTRAMUSCULAR; INTRAVENOUS at 11:48

## 2022-07-01 RX ADMIN — PROPOFOL 50 MG: 10 INJECTION, EMULSION INTRAVENOUS at 11:58

## 2022-07-01 RX ADMIN — PROPOFOL 50 MG: 10 INJECTION, EMULSION INTRAVENOUS at 11:54

## 2022-07-01 RX ADMIN — PROPOFOL 150 MG: 10 INJECTION, EMULSION INTRAVENOUS at 11:51

## 2022-07-01 RX ADMIN — SODIUM CHLORIDE, SODIUM LACTATE, POTASSIUM CHLORIDE, AND CALCIUM CHLORIDE: .6; .31; .03; .02 INJECTION, SOLUTION INTRAVENOUS at 11:37

## 2022-07-01 RX ADMIN — LIDOCAINE HYDROCHLORIDE 5 ML: 20 INJECTION, SOLUTION EPIDURAL; INFILTRATION; INTRACAUDAL; PERINEURAL at 11:50

## 2022-07-01 NOTE — ANESTHESIA POSTPROCEDURE EVALUATION
Post-Op Assessment Note    CV Status:  Stable  Pain Score: 0    Pain management: adequate     Mental Status:  Sleepy   Hydration Status:  Stable and euvolemic   PONV Controlled:  None   Airway Patency:  Patent      Post Op Vitals Reviewed: Yes      Staff: CRNA         No complications documented      BP   133/71   Temp     Pulse 76   Resp 18   SpO2 99

## 2022-07-01 NOTE — ANESTHESIA PREPROCEDURE EVALUATION
Procedure:  COLONOSCOPY  EGD    Relevant Problems   No relevant active problems   GERD  Inhaler listed in medication list - rare use for allergies  Former smoker     Physical Exam    Airway    Mallampati score: III  TM Distance: >3 FB  Neck ROM: full     Dental   No notable dental hx     Cardiovascular  Rate: normal,     Pulmonary  Breath sounds clear to auscultation,     Other Findings  Thick neck      Anesthesia Plan  ASA Score- 2     Anesthesia Type- IV sedation with anesthesia with ASA Monitors  Additional Monitors:   Airway Plan:           Plan Factors-    Chart reviewed  Patient summary reviewed  Induction- intravenous  Postoperative Plan-     Informed Consent- Anesthetic plan and risks discussed with patient  I personally reviewed this patient with the CRNA  Discussed and agreed on the Anesthesia Plan with the CRNA  Elder Phelps

## 2022-07-01 NOTE — H&P
History and Physical -  [facility]  Beti Rose 40 y o  male MRN: [id number]        HPI:  50-year-old male reports having symptoms of dyspepsia and irregular bowels  Historical Information   Past Medical History:   Diagnosis Date    GERD (gastroesophageal reflux disease)      Past Surgical History:   Procedure Laterality Date    NASAL/SINUS ENDOSCOPY N/A 2/13/2019    Procedure: REVISION ENDOSCOPIC POSTERIOR SEPTOPLASTY AND LYSIS INTRANASAL ADHESIONS;  Surgeon: Luther Rosen MD;  Location: [facility];  Service: ENT    SEPTOPLASTY      WISDOM TOOTH EXTRACTION       Social History   Social History     Substance and Sexual Activity   Alcohol Use Yes    Comment: Rare      Social History     Substance and Sexual Activity   Drug Use No     Social History     Tobacco Use   Smoking Status Former Smoker    Types: Cigars   Smokeless Tobacco Never Used     Family History   Problem Relation Age of Onset    No Known Problems Mother     No Known Problems Father     Colon cancer Cousin        Meds/Allergies     (Not in a hospital admission)      Allergies   Allergen Reactions    Other Anaphylaxis     Bee stings stepped on nest as child    Amoxicillin      Childhood allergy    Pollen Extract Other (See Comments)       Objective     Blood pressure 129/89, pulse 78, temperature (!) 97 1 °F (36 2 °C), temperature source Temporal, resp  rate 16, height 5' 9" (1 753 m), weight 125 kg (275 lb), SpO2 97 %      PHYSICAL EXAM:    Gen: NAD  CV: S1 & S2 normal, RRR  CHEST: Clear to auscultate  ABD: soft, NT/ND, good bowel sounds  EXT: no edema    ASSESSMENT:     Dyspepsia, irregular bowel habits    PLAN:    EGD and colonoscopy

## 2022-07-01 NOTE — DISCHARGE INSTRUCTIONS
Upper Endoscopy and Colonoscopy   WHAT YOU NEED TO KNOW:   An upper endoscopy is also called an upper gastrointestinal (GI) endoscopy, or an esophagogastroduodenoscopy (EGD)  It is a procedure to examine the inside of your esophagus, stomach, and duodenum (first part of the small intestine) with a scope  You may feel bloated, gassy, or have some abdominal discomfort after your procedure  Your throat may be sore for 24 to 36 hours  You may burp or pass gas from air that is still inside your body  A colonoscopy is a procedure to examine the inside of your colon (intestine) with a scope  Polyps or tissue growths may have been removed during your colonoscopy  It is normal to feel bloated and to have some abdominal discomfort  You should be passing gas  If you have hemorrhoids or you had polyps removed, you may have a small amount of bleeding  DISCHARGE INSTRUCTIONS:   Seek care immediately if:   You have sudden, severe abdominal pain  You have problems swallowing  You have a large amount of black, sticky bowel movements or blood in your bowel movements  You have sudden trouble breathing  You feel weak, lightheaded, or faint or your heart beats faster than normal for you  Contact your healthcare provider if:   You have a fever and chills  You have nausea or are vomiting  Your abdomen is bloated or feels full and hard  You have abdominal pain  You have a large amount of black, sticky bowel movements or blood in your bowel movements  You have not had a bowel movement for 3 days after your procedure  You have rash or hives  You have questions or concerns about your procedure  Activity:   ·       Do not lift, strain, or run for 24 hours after your procedure  ·       Rest after your procedure  You have been given medicine to relax you  Do not drive or make important decisions until the day after your procedure   Return to your normal activity as directed  ·       Relieve gas and discomfort from bloating by lying on your right side with a heating pad on your abdomen  You may need to take short walks to help the gas move out  Eat small meals until bloating is relieved  Follow up with your healthcare provider as directed: Write down your questions so you remember to ask them during your visits  If you take a blood thinner, please review the specific instructions from your endoscopist about when you should resume it  These can be found in the Recommendation and Your Medication list sections of this After Visit Summary   '

## 2022-11-04 DIAGNOSIS — R14.0 BLOATING: ICD-10-CM

## 2022-11-04 DIAGNOSIS — R19.8 IRREGULAR BOWEL HABITS: ICD-10-CM

## 2022-11-04 RX ORDER — PANTOPRAZOLE SODIUM 40 MG/1
TABLET, DELAYED RELEASE ORAL
Qty: 90 TABLET | Refills: 1 | Status: SHIPPED | OUTPATIENT
Start: 2022-11-04

## 2023-05-09 DIAGNOSIS — R14.0 BLOATING: ICD-10-CM

## 2023-05-09 DIAGNOSIS — R19.8 IRREGULAR BOWEL HABITS: ICD-10-CM

## 2023-05-09 RX ORDER — PANTOPRAZOLE SODIUM 40 MG/1
TABLET, DELAYED RELEASE ORAL
Qty: 90 TABLET | Refills: 1 | Status: SHIPPED | OUTPATIENT
Start: 2023-05-09

## 2023-11-14 DIAGNOSIS — R14.0 BLOATING: ICD-10-CM

## 2023-11-14 DIAGNOSIS — R19.8 IRREGULAR BOWEL HABITS: ICD-10-CM

## 2023-11-14 RX ORDER — PANTOPRAZOLE SODIUM 40 MG/1
TABLET, DELAYED RELEASE ORAL
Qty: 30 TABLET | Refills: 0 | Status: SHIPPED | OUTPATIENT
Start: 2023-11-14

## 2023-11-28 DIAGNOSIS — R19.8 IRREGULAR BOWEL HABITS: ICD-10-CM

## 2023-11-28 DIAGNOSIS — R14.0 BLOATING: ICD-10-CM

## 2023-11-28 RX ORDER — PANTOPRAZOLE SODIUM 40 MG/1
TABLET, DELAYED RELEASE ORAL
Qty: 90 TABLET | Refills: 1 | Status: SHIPPED | OUTPATIENT
Start: 2023-11-28
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
## 2024-07-11 ENCOUNTER — HOSPITAL ENCOUNTER (OUTPATIENT)
Dept: NUCLEAR MEDICINE | Facility: HOSPITAL | Age: 39
Discharge: HOME | End: 2024-07-11
Payer: COMMERCIAL

## 2024-07-11 DIAGNOSIS — S29.019D STRAIN OF MUSCLE AND TENDON OF UNSPECIFIED WALL OF THORAX, SUBSEQUENT ENCOUNTER: ICD-10-CM

## 2024-07-11 PROCEDURE — 78306 BONE IMAGING WHOLE BODY: CPT

## 2024-07-11 PROCEDURE — A9503 TC99M MEDRONATE: HCPCS

## (undated) DEVICE — GLOVE SRG BIOGEL ECLIPSE 7

## (undated) DEVICE — ANTI-FOG SOLUTION WITH FOAM PAD: Brand: DEVON

## (undated) DEVICE — LIGHT GLOVE GREEN

## (undated) DEVICE — NEEDLE 25G X 1 1/2

## (undated) DEVICE — NEURO PATTIES 1/2 X 3

## (undated) DEVICE — DRAPE SURGIKIT SADDLE BAG

## (undated) DEVICE — INTENDED FOR TISSUE SEPARATION, AND OTHER PROCEDURES THAT REQUIRE A SHARP SURGICAL BLADE TO PUNCTURE OR CUT.: Brand: BARD-PARKER ® CARBON RIB-BACK BLADES

## (undated) DEVICE — SCD SEQUENTIAL COMPRESSION COMFORT SLEEVE MEDIUM KNEE LENGTH: Brand: KENDALL SCD

## (undated) DEVICE — MAGNETIC INSTRUMENT PAD 16" X 20"; LARGE; DISPOSABLE: Brand: CARDINAL HEALTH

## (undated) DEVICE — SYRINGE 3ML LL

## (undated) DEVICE — SPLINT 1524055 DOYLE II AIRWAY SET: Brand: DOYLE II ™

## (undated) DEVICE — STERILE NASAL PACK: Brand: CARDINAL HEALTH

## (undated) DEVICE — REM POLYHESIVE ADULT PATIENT RETURN ELECTRODE: Brand: VALLEYLAB